# Patient Record
Sex: MALE | Race: OTHER | NOT HISPANIC OR LATINO | URBAN - METROPOLITAN AREA
[De-identification: names, ages, dates, MRNs, and addresses within clinical notes are randomized per-mention and may not be internally consistent; named-entity substitution may affect disease eponyms.]

---

## 2024-05-07 ENCOUNTER — HOSPITAL ENCOUNTER (INPATIENT)
Facility: HOSPITAL | Age: 44
LOS: 1 days | Discharge: HOME | DRG: 439 | End: 2024-05-09
Attending: EMERGENCY MEDICINE | Admitting: STUDENT IN AN ORGANIZED HEALTH CARE EDUCATION/TRAINING PROGRAM

## 2024-05-07 DIAGNOSIS — Z79.4 TYPE 2 DIABETES MELLITUS WITH OTHER SPECIFIED COMPLICATION, WITH LONG-TERM CURRENT USE OF INSULIN: ICD-10-CM

## 2024-05-07 DIAGNOSIS — R79.89 ELEVATED LFTS: ICD-10-CM

## 2024-05-07 DIAGNOSIS — R10.9 ABDOMINAL PAIN: ICD-10-CM

## 2024-05-07 DIAGNOSIS — F10.20 UNCOMPLICATED ALCOHOL DEPENDENCE: ICD-10-CM

## 2024-05-07 DIAGNOSIS — R07.9 CHEST PAIN: ICD-10-CM

## 2024-05-07 DIAGNOSIS — R10.13 EPIGASTRIC ABDOMINAL PAIN: Primary | ICD-10-CM

## 2024-05-07 DIAGNOSIS — K85.20 ALCOHOL-INDUCED ACUTE PANCREATITIS WITHOUT INFECTION OR NECROSIS: ICD-10-CM

## 2024-05-07 DIAGNOSIS — Z09 HOSPITAL DISCHARGE FOLLOW-UP: ICD-10-CM

## 2024-05-07 DIAGNOSIS — R11.2 NAUSEA AND VOMITING, UNSPECIFIED VOMITING TYPE: ICD-10-CM

## 2024-05-07 DIAGNOSIS — E11.69 TYPE 2 DIABETES MELLITUS WITH OTHER SPECIFIED COMPLICATION, WITH LONG-TERM CURRENT USE OF INSULIN: ICD-10-CM

## 2024-05-07 DIAGNOSIS — F10.90 ALCOHOL USE DISORDER: ICD-10-CM

## 2024-05-07 PROBLEM — I10 PRIMARY HYPERTENSION: Status: ACTIVE | Noted: 2024-05-07

## 2024-05-07 PROBLEM — E11.9 T2DM (TYPE 2 DIABETES MELLITUS): Status: ACTIVE | Noted: 2024-05-07

## 2024-05-07 PROBLEM — K76.0 HEPATIC STEATOSIS: Status: ACTIVE | Noted: 2024-05-07

## 2024-05-07 LAB
ALBUMIN SERPL BCP-MCNC: 3.9 G/DL (ref 3.5–5.2)
ALP SERPL-CCNC: 90 U/L (ref 55–135)
ALT SERPL W/O P-5'-P-CCNC: 610 U/L (ref 10–44)
ANION GAP SERPL CALC-SCNC: 14 MMOL/L (ref 8–16)
APAP SERPL-MCNC: <3 UG/ML (ref 10–20)
APTT PPP: 28.4 SEC (ref 21–32)
AST SERPL-CCNC: 547 U/L (ref 10–40)
BASOPHILS # BLD AUTO: 0.04 K/UL (ref 0–0.2)
BASOPHILS NFR BLD: 0.3 % (ref 0–1.9)
BILIRUB SERPL-MCNC: 1.3 MG/DL (ref 0.1–1)
BUN SERPL-MCNC: 9 MG/DL (ref 6–20)
BUN SERPL-MCNC: 9 MG/DL (ref 6–30)
CALCIUM SERPL-MCNC: 9.4 MG/DL (ref 8.7–10.5)
CHLORIDE SERPL-SCNC: 98 MMOL/L (ref 95–110)
CHLORIDE SERPL-SCNC: 99 MMOL/L (ref 95–110)
CO2 SERPL-SCNC: 21 MMOL/L (ref 23–29)
CREAT SERPL-MCNC: 0.7 MG/DL (ref 0.5–1.4)
CREAT SERPL-MCNC: 0.9 MG/DL (ref 0.5–1.4)
DIFFERENTIAL METHOD BLD: ABNORMAL
EOSINOPHIL # BLD AUTO: 0.1 K/UL (ref 0–0.5)
EOSINOPHIL NFR BLD: 0.6 % (ref 0–8)
ERYTHROCYTE [DISTWIDTH] IN BLOOD BY AUTOMATED COUNT: 14.2 % (ref 11.5–14.5)
EST. GFR  (NO RACE VARIABLE): >60 ML/MIN/1.73 M^2
ESTIMATED AVG GLUCOSE: 266 MG/DL (ref 68–131)
ETHANOL SERPL-MCNC: <10 MG/DL
GLUCOSE SERPL-MCNC: 262 MG/DL (ref 70–110)
GLUCOSE SERPL-MCNC: 267 MG/DL (ref 70–110)
HAV IGM SERPL QL IA: NORMAL
HBA1C MFR BLD: 10.9 % (ref 4–5.6)
HBV CORE IGM SERPL QL IA: NORMAL
HBV SURFACE AG SERPL QL IA: NORMAL
HCT VFR BLD AUTO: 45.2 % (ref 40–54)
HCT VFR BLD CALC: 53 %PCV (ref 36–54)
HCV AB SERPL QL IA: NORMAL
HCV AB SERPL QL IA: NORMAL
HGB BLD-MCNC: 15.1 G/DL (ref 14–18)
HIV 1+2 AB+HIV1 P24 AG SERPL QL IA: NORMAL
IMM GRANULOCYTES # BLD AUTO: 0.05 K/UL (ref 0–0.04)
IMM GRANULOCYTES NFR BLD AUTO: 0.4 % (ref 0–0.5)
INR PPP: 1.1 (ref 0.8–1.2)
INR PPP: 1.1 (ref 0.8–1.2)
LACTATE SERPL-SCNC: 1.1 MMOL/L (ref 0.5–2.2)
LIPASE SERPL-CCNC: 227 U/L (ref 4–60)
LYMPHOCYTES # BLD AUTO: 1.6 K/UL (ref 1–4.8)
LYMPHOCYTES NFR BLD: 13.4 % (ref 18–48)
MCH RBC QN AUTO: 25.5 PG (ref 27–31)
MCHC RBC AUTO-ENTMCNC: 33.4 G/DL (ref 32–36)
MCV RBC AUTO: 76 FL (ref 82–98)
MONOCYTES # BLD AUTO: 0.5 K/UL (ref 0.3–1)
MONOCYTES NFR BLD: 3.9 % (ref 4–15)
NEUTROPHILS # BLD AUTO: 10 K/UL (ref 1.8–7.7)
NEUTROPHILS NFR BLD: 81.4 % (ref 38–73)
NRBC BLD-RTO: 0 /100 WBC
OHS QRS DURATION: 76 MS
OHS QTC CALCULATION: 428 MS
PLATELET # BLD AUTO: 271 K/UL (ref 150–450)
PMV BLD AUTO: 9.7 FL (ref 9.2–12.9)
POC IONIZED CALCIUM: 1.12 MMOL/L (ref 1.06–1.42)
POC TCO2 (MEASURED): 25 MMOL/L (ref 23–29)
POCT GLUCOSE: 248 MG/DL (ref 70–110)
POTASSIUM BLD-SCNC: 3.8 MMOL/L (ref 3.5–5.1)
POTASSIUM SERPL-SCNC: 3.9 MMOL/L (ref 3.5–5.1)
PROT SERPL-MCNC: 7.2 G/DL (ref 6–8.4)
PROTHROMBIN TIME: 12.1 SEC (ref 9–12.5)
PROTHROMBIN TIME: 12.4 SEC (ref 9–12.5)
RBC # BLD AUTO: 5.92 M/UL (ref 4.6–6.2)
SAMPLE: ABNORMAL
SODIUM BLD-SCNC: 134 MMOL/L (ref 136–145)
SODIUM SERPL-SCNC: 134 MMOL/L (ref 136–145)
WBC # BLD AUTO: 12.24 K/UL (ref 3.9–12.7)

## 2024-05-07 PROCEDURE — G0378 HOSPITAL OBSERVATION PER HR: HCPCS

## 2024-05-07 PROCEDURE — 63600175 PHARM REV CODE 636 W HCPCS: Performed by: EMERGENCY MEDICINE

## 2024-05-07 PROCEDURE — 63600175 PHARM REV CODE 636 W HCPCS: Performed by: INTERNAL MEDICINE

## 2024-05-07 PROCEDURE — 80321 ALCOHOLS BIOMARKERS 1OR 2: CPT | Performed by: STUDENT IN AN ORGANIZED HEALTH CARE EDUCATION/TRAINING PROGRAM

## 2024-05-07 PROCEDURE — 85025 COMPLETE CBC W/AUTO DIFF WBC: CPT | Performed by: EMERGENCY MEDICINE

## 2024-05-07 PROCEDURE — 83690 ASSAY OF LIPASE: CPT | Performed by: EMERGENCY MEDICINE

## 2024-05-07 PROCEDURE — 80047 BASIC METABLC PNL IONIZED CA: CPT

## 2024-05-07 PROCEDURE — 82077 ASSAY SPEC XCP UR&BREATH IA: CPT | Performed by: STUDENT IN AN ORGANIZED HEALTH CARE EDUCATION/TRAINING PROGRAM

## 2024-05-07 PROCEDURE — 80053 COMPREHEN METABOLIC PANEL: CPT | Performed by: EMERGENCY MEDICINE

## 2024-05-07 PROCEDURE — 99285 EMERGENCY DEPT VISIT HI MDM: CPT | Mod: 25

## 2024-05-07 PROCEDURE — 96374 THER/PROPH/DIAG INJ IV PUSH: CPT

## 2024-05-07 PROCEDURE — 96361 HYDRATE IV INFUSION ADD-ON: CPT

## 2024-05-07 PROCEDURE — 85610 PROTHROMBIN TIME: CPT | Performed by: EMERGENCY MEDICINE

## 2024-05-07 PROCEDURE — 83036 HEMOGLOBIN GLYCOSYLATED A1C: CPT | Performed by: STUDENT IN AN ORGANIZED HEALTH CARE EDUCATION/TRAINING PROGRAM

## 2024-05-07 PROCEDURE — 85730 THROMBOPLASTIN TIME PARTIAL: CPT | Performed by: EMERGENCY MEDICINE

## 2024-05-07 PROCEDURE — 85610 PROTHROMBIN TIME: CPT | Mod: 91 | Performed by: STUDENT IN AN ORGANIZED HEALTH CARE EDUCATION/TRAINING PROGRAM

## 2024-05-07 PROCEDURE — 25000003 PHARM REV CODE 250: Performed by: EMERGENCY MEDICINE

## 2024-05-07 PROCEDURE — 80143 DRUG ASSAY ACETAMINOPHEN: CPT | Performed by: STUDENT IN AN ORGANIZED HEALTH CARE EDUCATION/TRAINING PROGRAM

## 2024-05-07 PROCEDURE — 83605 ASSAY OF LACTIC ACID: CPT | Performed by: EMERGENCY MEDICINE

## 2024-05-07 PROCEDURE — 82962 GLUCOSE BLOOD TEST: CPT

## 2024-05-07 PROCEDURE — 87389 HIV-1 AG W/HIV-1&-2 AB AG IA: CPT | Performed by: PHYSICIAN ASSISTANT

## 2024-05-07 PROCEDURE — 63600175 PHARM REV CODE 636 W HCPCS: Performed by: STUDENT IN AN ORGANIZED HEALTH CARE EDUCATION/TRAINING PROGRAM

## 2024-05-07 PROCEDURE — 93005 ELECTROCARDIOGRAM TRACING: CPT

## 2024-05-07 PROCEDURE — 93010 ELECTROCARDIOGRAM REPORT: CPT | Mod: ,,, | Performed by: INTERNAL MEDICINE

## 2024-05-07 PROCEDURE — 96375 TX/PRO/DX INJ NEW DRUG ADDON: CPT

## 2024-05-07 PROCEDURE — 25000003 PHARM REV CODE 250: Performed by: STUDENT IN AN ORGANIZED HEALTH CARE EDUCATION/TRAINING PROGRAM

## 2024-05-07 PROCEDURE — 80074 ACUTE HEPATITIS PANEL: CPT | Performed by: EMERGENCY MEDICINE

## 2024-05-07 PROCEDURE — 96376 TX/PRO/DX INJ SAME DRUG ADON: CPT

## 2024-05-07 PROCEDURE — 25500020 PHARM REV CODE 255: Performed by: EMERGENCY MEDICINE

## 2024-05-07 RX ORDER — ONDANSETRON HYDROCHLORIDE 2 MG/ML
8 INJECTION, SOLUTION INTRAVENOUS EVERY 8 HOURS PRN
Status: DISCONTINUED | OUTPATIENT
Start: 2024-05-07 | End: 2024-05-09 | Stop reason: HOSPADM

## 2024-05-07 RX ORDER — ALUMINUM HYDROXIDE, MAGNESIUM HYDROXIDE, AND SIMETHICONE 1200; 120; 1200 MG/30ML; MG/30ML; MG/30ML
30 SUSPENSION ORAL 4 TIMES DAILY PRN
Status: DISCONTINUED | OUTPATIENT
Start: 2024-05-07 | End: 2024-05-09 | Stop reason: HOSPADM

## 2024-05-07 RX ORDER — SODIUM CHLORIDE, SODIUM LACTATE, POTASSIUM CHLORIDE, CALCIUM CHLORIDE 600; 310; 30; 20 MG/100ML; MG/100ML; MG/100ML; MG/100ML
INJECTION, SOLUTION INTRAVENOUS CONTINUOUS
Status: DISCONTINUED | OUTPATIENT
Start: 2024-05-07 | End: 2024-05-08

## 2024-05-07 RX ORDER — MORPHINE SULFATE 4 MG/ML
4 INJECTION, SOLUTION INTRAMUSCULAR; INTRAVENOUS
Status: COMPLETED | OUTPATIENT
Start: 2024-05-07 | End: 2024-05-07

## 2024-05-07 RX ORDER — FAMOTIDINE 10 MG/ML
20 INJECTION INTRAVENOUS
Status: COMPLETED | OUTPATIENT
Start: 2024-05-07 | End: 2024-05-07

## 2024-05-07 RX ORDER — ALUMINUM HYDROXIDE, MAGNESIUM HYDROXIDE, AND SIMETHICONE 1200; 120; 1200 MG/30ML; MG/30ML; MG/30ML
15 SUSPENSION ORAL
Status: COMPLETED | OUTPATIENT
Start: 2024-05-07 | End: 2024-05-07

## 2024-05-07 RX ORDER — MORPHINE SULFATE 4 MG/ML
3 INJECTION, SOLUTION INTRAMUSCULAR; INTRAVENOUS EVERY 6 HOURS PRN
Status: DISCONTINUED | OUTPATIENT
Start: 2024-05-07 | End: 2024-05-09 | Stop reason: HOSPADM

## 2024-05-07 RX ORDER — IBUPROFEN 200 MG
24 TABLET ORAL
Status: DISCONTINUED | OUTPATIENT
Start: 2024-05-07 | End: 2024-05-09 | Stop reason: HOSPADM

## 2024-05-07 RX ORDER — OXYCODONE HYDROCHLORIDE 5 MG/1
5 TABLET ORAL EVERY 6 HOURS PRN
Status: DISCONTINUED | OUTPATIENT
Start: 2024-05-07 | End: 2024-05-09 | Stop reason: HOSPADM

## 2024-05-07 RX ORDER — GLUCAGON 1 MG
1 KIT INJECTION
Status: DISCONTINUED | OUTPATIENT
Start: 2024-05-07 | End: 2024-05-07

## 2024-05-07 RX ORDER — GLUCAGON 1 MG
1 KIT INJECTION
Status: DISCONTINUED | OUTPATIENT
Start: 2024-05-07 | End: 2024-05-09 | Stop reason: HOSPADM

## 2024-05-07 RX ORDER — SODIUM CHLORIDE, SODIUM LACTATE, POTASSIUM CHLORIDE, CALCIUM CHLORIDE 600; 310; 30; 20 MG/100ML; MG/100ML; MG/100ML; MG/100ML
INJECTION, SOLUTION INTRAVENOUS CONTINUOUS
Status: DISCONTINUED | OUTPATIENT
Start: 2024-05-07 | End: 2024-05-07

## 2024-05-07 RX ORDER — IBUPROFEN 200 MG
16 TABLET ORAL
Status: DISCONTINUED | OUTPATIENT
Start: 2024-05-07 | End: 2024-05-07

## 2024-05-07 RX ORDER — INSULIN ASPART 100 [IU]/ML
0-5 INJECTION, SOLUTION INTRAVENOUS; SUBCUTANEOUS
Status: DISCONTINUED | OUTPATIENT
Start: 2024-05-07 | End: 2024-05-07

## 2024-05-07 RX ORDER — NALOXONE HCL 0.4 MG/ML
0.02 VIAL (ML) INJECTION
Status: DISCONTINUED | OUTPATIENT
Start: 2024-05-07 | End: 2024-05-09 | Stop reason: HOSPADM

## 2024-05-07 RX ORDER — HYDRALAZINE HYDROCHLORIDE 20 MG/ML
10 INJECTION INTRAMUSCULAR; INTRAVENOUS EVERY 6 HOURS PRN
Status: DISCONTINUED | OUTPATIENT
Start: 2024-05-07 | End: 2024-05-08

## 2024-05-07 RX ORDER — IBUPROFEN 200 MG
24 TABLET ORAL
Status: DISCONTINUED | OUTPATIENT
Start: 2024-05-07 | End: 2024-05-07

## 2024-05-07 RX ORDER — AMLODIPINE BESYLATE 5 MG/1
5 TABLET ORAL DAILY
Status: DISCONTINUED | OUTPATIENT
Start: 2024-05-07 | End: 2024-05-09

## 2024-05-07 RX ORDER — SODIUM CHLORIDE 0.9 % (FLUSH) 0.9 %
10 SYRINGE (ML) INJECTION EVERY 12 HOURS PRN
Status: DISCONTINUED | OUTPATIENT
Start: 2024-05-07 | End: 2024-05-09 | Stop reason: HOSPADM

## 2024-05-07 RX ORDER — PROMETHAZINE HYDROCHLORIDE 25 MG/1
25 TABLET ORAL EVERY 6 HOURS PRN
Status: DISCONTINUED | OUTPATIENT
Start: 2024-05-07 | End: 2024-05-09 | Stop reason: HOSPADM

## 2024-05-07 RX ORDER — IBUPROFEN 200 MG
16 TABLET ORAL
Status: DISCONTINUED | OUTPATIENT
Start: 2024-05-07 | End: 2024-05-09 | Stop reason: HOSPADM

## 2024-05-07 RX ORDER — TALC
6 POWDER (GRAM) TOPICAL NIGHTLY PRN
Status: DISCONTINUED | OUTPATIENT
Start: 2024-05-07 | End: 2024-05-09 | Stop reason: HOSPADM

## 2024-05-07 RX ORDER — INSULIN ASPART 100 [IU]/ML
0-10 INJECTION, SOLUTION INTRAVENOUS; SUBCUTANEOUS
Status: DISCONTINUED | OUTPATIENT
Start: 2024-05-07 | End: 2024-05-09 | Stop reason: HOSPADM

## 2024-05-07 RX ORDER — POLYETHYLENE GLYCOL 3350 17 G/17G
17 POWDER, FOR SOLUTION ORAL DAILY
Status: DISCONTINUED | OUTPATIENT
Start: 2024-05-08 | End: 2024-05-09 | Stop reason: HOSPADM

## 2024-05-07 RX ORDER — ONDANSETRON HYDROCHLORIDE 2 MG/ML
4 INJECTION, SOLUTION INTRAVENOUS
Status: COMPLETED | OUTPATIENT
Start: 2024-05-07 | End: 2024-05-07

## 2024-05-07 RX ADMIN — AMLODIPINE BESYLATE 5 MG: 5 TABLET ORAL at 08:05

## 2024-05-07 RX ADMIN — OXYCODONE 5 MG: 5 TABLET ORAL at 07:05

## 2024-05-07 RX ADMIN — IOHEXOL 100 ML: 350 INJECTION, SOLUTION INTRAVENOUS at 03:05

## 2024-05-07 RX ADMIN — MORPHINE SULFATE 4 MG: 4 INJECTION INTRAVENOUS at 02:05

## 2024-05-07 RX ADMIN — HYDRALAZINE HYDROCHLORIDE 10 MG: 20 INJECTION, SOLUTION INTRAMUSCULAR; INTRAVENOUS at 09:05

## 2024-05-07 RX ADMIN — ALUMINUM HYDROXIDE, MAGNESIUM HYDROXIDE, AND SIMETHICONE 15 ML: 200; 200; 20 SUSPENSION ORAL at 02:05

## 2024-05-07 RX ADMIN — SODIUM CHLORIDE, POTASSIUM CHLORIDE, SODIUM LACTATE AND CALCIUM CHLORIDE: 600; 310; 30; 20 INJECTION, SOLUTION INTRAVENOUS at 06:05

## 2024-05-07 RX ADMIN — ONDANSETRON 4 MG: 2 INJECTION INTRAMUSCULAR; INTRAVENOUS at 02:05

## 2024-05-07 RX ADMIN — MORPHINE SULFATE 3 MG: 4 INJECTION INTRAVENOUS at 05:05

## 2024-05-07 RX ADMIN — SODIUM CHLORIDE 1000 ML: 9 INJECTION, SOLUTION INTRAVENOUS at 02:05

## 2024-05-07 RX ADMIN — FAMOTIDINE 20 MG: 10 INJECTION, SOLUTION INTRAVENOUS at 02:05

## 2024-05-07 NOTE — HPI
The patient is a 42 y/o M with NIDDM (unsure of his last A1c, off all therapies for some time), HTN and EtOH use disorder that presents to [facility] with 12 hours of epigastric abdominal pain. He notes that the pain came all of a sudden and was 10/10, localized to the epigastric region without radiation. Abdominal pain was associated with nausea and vomiting. He reports bilious emesis. 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 Drink 5-6 shots of fireball daily + 2 -3 beers. He lives with his brother in [facility]. His brother brought him to the hospital today. He has not taking any medicines for diabetes or HTN in some time, but he is not sure for how long. States he was on insulin when he was first diagnosed with diabetes in 2013 or so, but was off insulin and on oral meds within 1-2 years. He denies any fevers, chills, cough, congestion, HA, palpitations or urinary symptoms. He does have his gallbladder and appendix. Last drink was 1-2 nights ago. He is not yet interested in meeting with addiction psychiatry.

## 2024-05-07 NOTE — ASSESSMENT & PLAN NOTE
As evidenced by imaging findings on CT on 5/7, classic abdominal pain as well as Lipase 3x ULN.  - Supportive care  - Low fiber/low residue diet as patient reports an appetite   - Will start LR at 150 cc's/hr and reassess symptoms in the am   - No evidence of secondary complications   - Strict alcohol cessation   - Check PETH and Ethanol Level

## 2024-05-07 NOTE — ED TRIAGE NOTES
44 y/o M presents to ER with c/c abd pain rated 10/10. Pt diagnosed with hernia, pain been unbearable. Denies any dysuria, c/p, SOB, N/V/D, fevers and chills.

## 2024-05-07 NOTE — ASSESSMENT & PLAN NOTE
CT appears to show hepatic steatosis. Pattern is not typical that you would expect from EtOH alone. Hep panel negative.   - Trend daily CMP  - Obtain Liver US with doppler; assess biliary tree and GB for possible stones   - Pending trend and imaging findings, will consider adding in full workup for AI conditions and consider hepatology consult

## 2024-05-07 NOTE — ASSESSMENT & PLAN NOTE
Likely component of alcohol use and MASLD  - Will encourage exercise and weight loss  - Alcohol cessation counseling  - Hepatology clinic follow up

## 2024-05-07 NOTE — SUBJECTIVE & OBJECTIVE
History reviewed. No pertinent past medical history.    History reviewed. No pertinent surgical history.    Review of patient's allergies indicates:  No Known Allergies    No current facility-administered medications on file prior to encounter.     No current outpatient medications on file prior to encounter.     Family History    None       Tobacco Use    Smoking status: Not on file    Smokeless tobacco: Not on file   Substance and Sexual Activity    Alcohol use: Not on file    Drug use: Not on file    Sexual activity: Not on file     Review of Systems   Constitutional:  Positive for activity change. Negative for chills and fever.   HENT:  Positive for sore throat. Negative for congestion.    Respiratory:  Negative for cough and shortness of breath.    Cardiovascular:  Positive for chest pain (ache). Negative for palpitations.   Gastrointestinal:  Positive for abdominal pain (local are in the epigastric region), nausea and vomiting. Negative for blood in stool.   Genitourinary:  Negative for dysuria, frequency and urgency.   Musculoskeletal:  Negative for back pain and neck pain.   Skin:  Negative for rash and wound.   Neurological:  Positive for dizziness. Negative for headaches.     Objective:     Vital Signs (Most Recent):  Temp: 98.6 °F (37 °C) (05/07/24 1451)  Pulse: 86 (05/07/24 1451)  Resp: 14 (05/07/24 1451)  BP: (!) 120/56 (05/07/24 1451)  SpO2: 98 % (05/07/24 1451) Vital Signs (24h Range):  Temp:  [98 °F (36.7 °C)-98.7 °F (37.1 °C)] 98.6 °F (37 °C)  Pulse:  [] 86  Resp:  [14-18] 14  SpO2:  [97 %-99 %] 98 %  BP: (120-177)/() 120/56     Weight: 104.3 kg (230 lb)  There is no height or weight on file to calculate BMI.     Physical Exam  Vitals reviewed.   Constitutional:       General: He is not in acute distress.     Appearance: He is not toxic-appearing.   HENT:      Head: Normocephalic and atraumatic.      Nose: Nose normal.      Mouth/Throat:      Mouth: Mucous membranes are dry.   Eyes:       General: No scleral icterus.     Extraocular Movements: Extraocular movements intact.   Cardiovascular:      Rate and Rhythm: Normal rate.      Pulses: Normal pulses.   Pulmonary:      Effort: Pulmonary effort is normal. No respiratory distress.      Breath sounds: No wheezing or rales.   Abdominal:      General: Abdomen is flat. There is no distension.      Palpations: Abdomen is soft. There is no mass.      Tenderness: There is abdominal tenderness (epigastric region). There is no guarding.   Skin:     General: Skin is warm and dry.      Coloration: Skin is not jaundiced.   Neurological:      Mental Status: He is alert and oriented to person, place, and time. Mental status is at baseline.   Psychiatric:         Mood and Affect: Mood normal.         Behavior: Behavior normal.                Significant Labs: All pertinent labs within the past 24 hours have been reviewed.    Significant Imaging: I have reviewed all pertinent imaging results/findings within the past 24 hours.

## 2024-05-07 NOTE — ASSESSMENT & PLAN NOTE
Reports history of. No medications the patient is currently on.   - Monitor BP noting elevations may be 2/2 pain from pancreatitis before initiating BP medications   - Most recent BP within acceptable range to not start new medications

## 2024-05-07 NOTE — ASSESSMENT & PLAN NOTE
Patient's FSGs are uncontrolled due to hyperglycemia on current medication regimen.  Last A1c reviewed-   Lab Results   Component Value Date    HGBA1C 11.1 (H) 01/26/2023     Most recent fingerstick glucose reviewed-   Recent Labs   Lab 05/07/24  1316   POCTGLUCOSE 248*     Current correctional scale  Low  Maintain anti-hyperglycemic dose as follows-   Antihyperglycemics (From admission, onward)      Start     Stop Route Frequency Ordered    05/07/24 1810  insulin aspart U-100 pen 0-10 Units         -- SubQ Before meals & nightly PRN 05/07/24 1711          Hold Oral hypoglycemics while patient is in the hospital.    Will consider adding scheduled insulin pending needs.   Follow up HbA1c.

## 2024-05-07 NOTE — ED NOTES
"Patient identifiers for Harshad Dodson 43 y.o. male checked and correct.  Chief Complaint   Patient presents with    Abdominal Pain     "Stomach feels like it about to burst" hx of hernia mid abdomen. Pain 9/10 wants blood sugar checked cbg= 248. States daily drinker. Last drink yesterday. Diaphoretic in triage      No past medical history on file.  Allergies reported: Review of patient's allergies indicates:  No Known Allergies      LOC: Patient is awake, alert, and aware of environment with an appropriate affect. Patient is oriented x 4 and speaking appropriately.  APPEARANCE: Patient is lying supine and guarding abdomen. Patient is clean and well groomed, patient's clothing is properly fastened.  HEENT: - JVD, + midline trach  SKIN: The skin is warm and dry. Patient has normal skin turgor and moist mucus membranes.   MUSKULOSKELETAL: Patient is moving all extremities well, no obvious deformities noted. Pulses intact. PMS x 4  RESPIRATORY: Airway is open and patent. Respirations are spontaneous and non-labored with normal effort and rate. = CBBS  CARDIAC: No peripheral edema noted. + 2 bilateral pedal and radial pulses, < 3 s cap refill.  ABDOMEN: No distention noted. Soft and tender to touch, pain rated 10/10.  NEUROLOGICAL: pupils 3 mm, PERRL. Facial expression is symmetrical. Hand grasps are equal bilaterally. Normal sensation in all extremities when touched with finger.        "

## 2024-05-07 NOTE — Clinical Note
Diagnosis: Epigastric abdominal pain [407810]   Future Attending Provider: COOKIE CEBALLOS [[id number]]

## 2024-05-07 NOTE — H&P
"  [facility] - Emergency Dept  [facility]  History & Physical    Patient Name: Harshad Dodson  MRN: [id number]  Patient Class: OP- Observation  Admission Date: 5/7/2024  Attending Physician: Francis Dhaliwal DO   Primary Care Provider: No primary care provider on file.         Patient information was obtained from patient and ER records.     Subjective:     Principal Problem:Alcohol-induced acute pancreatitis without infection or necrosis    Chief Complaint:   Chief Complaint   Patient presents with    Abdominal Pain     "Stomach feels like it about to burst" hx of hernia mid abdomen. Pain 9/10 wants blood sugar checked cbg= 248. States daily drinker. Last drink yesterday. Diaphoretic in triage         HPI: The patient is a 42 y/o M with NIDDM (unsure of his last A1c, off all therapies for some time), HTN and EtOH use disorder that presents to [facility] with 12 hours of epigastric abdominal pain. He notes that the pain came all of a sudden and was 10/10, localized to the epigastric region without radiation. Abdominal pain was associated with nausea and vomiting. He reports bilious emesis. 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 Drink 5-6 shots of fireball daily + 2 -3 beers. He lives with his brother in [facility]. His brother brought him to the hospital today. He has not taking any medicines for diabetes or HTN in some time, but he is not sure for how long. States he was on insulin when he was first diagnosed with diabetes in 2013 or so, but was off insulin and on oral meds within 1-2 years. He denies any fevers, chills, cough, congestion, HA, palpitations or urinary symptoms. He does have his gallbladder and appendix. Last drink was 1-2 nights ago. He is not yet interested in meeting with addiction psychiatry. "     History reviewed. No pertinent past medical history.    History reviewed. No pertinent surgical history.    Review of patient's allergies indicates:  No Known Allergies    No current facility-administered medications on file prior to encounter.     No current outpatient medications on file prior to encounter.     Family History    None       Tobacco Use    Smoking status: Not on file    Smokeless tobacco: Not on file   Substance and Sexual Activity    Alcohol use: Not on file    Drug use: Not on file    Sexual activity: Not on file     Review of Systems   Constitutional:  Positive for activity change. Negative for chills and fever.   HENT:  Positive for sore throat. Negative for congestion.    Respiratory:  Negative for cough and shortness of breath.    Cardiovascular:  Positive for chest pain (ache). Negative for palpitations.   Gastrointestinal:  Positive for abdominal pain (local are in the epigastric region), nausea and vomiting. Negative for blood in stool.   Genitourinary:  Negative for dysuria, frequency and urgency.   Musculoskeletal:  Negative for back pain and neck pain.   Skin:  Negative for rash and wound.   Neurological:  Positive for dizziness. Negative for headaches.     Objective:     Vital Signs (Most Recent):  Temp: 98.6 °F (37 °C) (05/07/24 1451)  Pulse: 86 (05/07/24 1451)  Resp: 14 (05/07/24 1451)  BP: (!) 120/56 (05/07/24 1451)  SpO2: 98 % (05/07/24 1451) Vital Signs (24h Range):  Temp:  [98 °F (36.7 °C)-98.7 °F (37.1 °C)] 98.6 °F (37 °C)  Pulse:  [] 86  Resp:  [14-18] 14  SpO2:  [97 %-99 %] 98 %  BP: (120-177)/() 120/56     Weight: 104.3 kg (230 lb)  There is no height or weight on file to calculate BMI.     Physical Exam  Vitals reviewed.   Constitutional:       General: He is not in acute distress.     Appearance: He is not toxic-appearing.   HENT:      Head: Normocephalic and atraumatic.      Nose: Nose normal.      Mouth/Throat:      Mouth: Mucous membranes are dry.   Eyes:       General: No scleral icterus.     Extraocular Movements: Extraocular movements intact.   Cardiovascular:      Rate and Rhythm: Normal rate.      Pulses: Normal pulses.   Pulmonary:      Effort: Pulmonary effort is normal. No respiratory distress.      Breath sounds: No wheezing or rales.   Abdominal:      General: Abdomen is flat. There is no distension.      Palpations: Abdomen is soft. There is no mass.      Tenderness: There is abdominal tenderness (epigastric region). There is no guarding.   Skin:     General: Skin is warm and dry.      Coloration: Skin is not jaundiced.   Neurological:      Mental Status: He is alert and oriented to person, place, and time. Mental status is at baseline.   Psychiatric:         Mood and Affect: Mood normal.         Behavior: Behavior normal.                Significant Labs: All pertinent labs within the past 24 hours have been reviewed.    Significant Imaging: I have reviewed all pertinent imaging results/findings within the past 24 hours.  Assessment/Plan:     * Alcohol-induced acute pancreatitis without infection or necrosis  As evidenced by imaging findings on CT on 5/7, classic abdominal pain as well as Lipase 3x ULN.  - Supportive care  - Low fiber/low residue diet as patient reports an appetite   - Will start LR at 150 cc's/hr and reassess symptoms in the am   - No evidence of secondary complications   - Strict alcohol cessation   - Check PETH and Ethanol Level     Hepatic steatosis  Likely component of alcohol use and MASLD  - Will encourage exercise and weight loss  - Alcohol cessation counseling  - Hepatology clinic follow up       T2DM (type 2 diabetes mellitus)  Patient's FSGs are uncontrolled due to hyperglycemia on current medication regimen.  Last A1c reviewed-   Lab Results   Component Value Date    HGBA1C 11.1 (H) 01/26/2023     Most recent fingerstick glucose reviewed-   Recent Labs   Lab 05/07/24  1316   POCTGLUCOSE 248*     Current correctional scale   Low  Maintain anti-hyperglycemic dose as follows-   Antihyperglycemics (From admission, onward)      Start     Stop Route Frequency Ordered    05/07/24 1810  insulin aspart U-100 pen 0-10 Units         -- SubQ Before meals & nightly PRN 05/07/24 1711          Hold Oral hypoglycemics while patient is in the hospital.    Will consider adding scheduled insulin pending needs.   Follow up HbA1c.     Primary hypertension  Reports history of. No medications the patient is currently on.   - Monitor BP noting elevations may be 2/2 pain from pancreatitis before initiating BP medications   - Most recent BP within acceptable range to not start new medications     Elevated LFTs  CT appears to show hepatic steatosis. Pattern is not typical that you would expect from EtOH alone. Hep panel negative.   - Trend daily CMP  - Obtain Liver US with doppler; assess biliary tree and GB for possible stones   - Pending trend and imaging findings, will consider adding in full workup for AI conditions and consider hepatology consult     Alcohol use disorder  - Counseled cessation  - Not yet interested in speaking with addiction psych, will monitor readiness to discuss further      VTE Risk Mitigation (From admission, onward)           Ordered     IP VTE LOW RISK PATIENT  Once         05/07/24 1652     Place sequential compression device  Until discontinued         05/07/24 1652                       On 05/07/2024, patient should be placed in hospital observation services under my care.             Francis Dhaliwal DO  Department of Hospital Medicine  [facility] - Emergency Dept

## 2024-05-07 NOTE — ASSESSMENT & PLAN NOTE
- Counseled cessation  - Not yet interested in speaking with addiction psych, will monitor readiness to discuss further

## 2024-05-07 NOTE — ED PROVIDER NOTES
"Encounter Date: 5/7/2024       History     Chief Complaint   Patient presents with    Abdominal Pain     "Stomach feels like it about to burst" hx of hernia mid abdomen. Pain 9/10 wants blood sugar checked cbg= 248. States daily drinker. Last drink yesterday. Diaphoretic in triage      Patient is a 43-year-old male with past medical history of DM2, alcohol dependence who presents with epigastric pain that started yesterday and worsened today.  He notes diarrhea yesterday and nausea with vomiting today.  He denies any bloody or coffee ground emesis.  He notes he wanted to take ibuprofen for his symptoms today but his blood sugar was elevated and he did not want to take it on an empty stomach.  He is a non-insulin dependent diabetic and was on pills however he stopped going to the doctor  and taking his medication last year.  He notes he drank 6 small fire balls and 2, 16 oz beer daily.  He would like to quit drinking as he reports he is using it has a clutch.  He has never been to rehab before, and he denies a history of complicated withdrawal including seizures or DTs.    The history is provided by the patient and medical records.     Review of patient's allergies indicates:  No Known Allergies  History reviewed. No pertinent past medical history.  History reviewed. No pertinent surgical history.  No family history on file.         Physical Exam     Initial Vitals [05/07/24 1316]   BP Pulse Resp Temp SpO2   135/89 106 18 98 °F (36.7 °C) 99 %      MAP       --         Physical Exam    Nursing note and vitals reviewed.  Constitutional: He appears well-developed and well-nourished. He is not diaphoretic. No distress.   HENT:   Head: Normocephalic and atraumatic.   Eyes: EOM are normal.   Neck: Neck supple.   Normal range of motion.  Cardiovascular:  Normal rate and regular rhythm.           Pulmonary/Chest: No respiratory distress.   Abdominal: Abdomen is soft. He exhibits no distension. There is abdominal tenderness " (Epigastric). There is no rebound and no guarding.   Musculoskeletal:         General: Normal range of motion.      Cervical back: Normal range of motion and neck supple.     Neurological: He is alert and oriented to person, place, and time. GCS score is 15. GCS eye subscore is 4. GCS verbal subscore is 5. GCS motor subscore is 6.   No tremors   Skin: Skin is warm and dry.   Psychiatric: He has a normal mood and affect. His behavior is normal. Judgment and thought content normal.         ED Course   Procedures  Labs Reviewed   CBC W/ AUTO DIFFERENTIAL - Abnormal; Notable for the following components:       Result Value    MCV 76 (*)     MCH 25.5 (*)     Gran # (ANC) 10.0 (*)     Immature Grans (Abs) 0.05 (*)     Gran % 81.4 (*)     Lymph % 13.4 (*)     Mono % 3.9 (*)     All other components within normal limits    Narrative:     Release to patient->Immediate   COMPREHENSIVE METABOLIC PANEL - Abnormal; Notable for the following components:    Sodium 134 (*)     CO2 21 (*)     Glucose 262 (*)     Total Bilirubin 1.3 (*)      (*)      (*)     All other components within normal limits    Narrative:     Release to patient->Immediate   LIPASE - Abnormal; Notable for the following components:    Lipase 227 (*)     All other components within normal limits    Narrative:     Release to patient->Immediate   HEMOGLOBIN A1C - Abnormal; Notable for the following components:    Hemoglobin A1C 10.9 (*)     Estimated Avg Glucose 266 (*)     All other components within normal limits    Narrative:     Unit collect    ACETAMINOPHEN LEVEL - Abnormal; Notable for the following components:    Acetaminophen (Tylenol), Serum <3.0 (*)     All other components within normal limits   POCT GLUCOSE - Abnormal; Notable for the following components:    POCT Glucose 248 (*)     All other components within normal limits   ISTAT PROCEDURE - Abnormal; Notable for the following components:    POC Glucose 267 (*)     POC Sodium 134 (*)      All other components within normal limits   HIV 1 / 2 ANTIBODY    Narrative:     Release to patient->Immediate  Add on HEPAC per MD [facility] order [id number]   HEPATITIS C ANTIBODY    Narrative:     Release to patient->Immediate  Add on HEPAC per MD MCNA[facility] order [id number]  absorbed by other test HEPAC   LACTIC ACID, PLASMA   HEPATITIS PANEL, ACUTE   APTT   PROTIME-INR   HEPATITIS PANEL, ACUTE    Narrative:     Release to patient->Immediate  Add on HEPAC per MD [facility]NA[facility] order [id number]   ALCOHOL,MEDICAL (ETHANOL)    Narrative:     Unit collect    PROTIME-INR   PHOSPHATIDYLETHANOL (PETH)    Narrative:     Unit collect    POCT GLUCOSE, HAND-HELD DEVICE   POCT GLUCOSE, HAND-HELD DEVICE   ISTAT CHEM8   POCT GLUCOSE MONITORING CONTINUOUS     EKG Readings: (Independently Interpreted)   Initial Reading: No STEMI.   EKG done at 1:22 p.m. normal sinus rhythm rate of 89 normal axis normal intervals     ECG Results              EKG 12-lead (Final result)        Collection Time Result Time QRS Duration OHS QTC Calculation    05/07/24 13:22:33 05/07/24 14:38:11 76 428                     Final result by Interface, Lab In [facility] (05/07/24 14:38:15)                   Narrative:    Test Reason : R10.9,    Vent. Rate : 089 BPM     Atrial Rate : 089 BPM     P-R Int : 132 ms          QRS Dur : 076 ms      QT Int : 352 ms       P-R-T Axes : 032 -14 049 degrees     QTc Int : 428 ms    Normal sinus rhythm  Minimal voltage criteria for LVH, may be normal variant ( R in aVL )  Borderline Abnormal ECG  No previous ECGs available  Confirmed by Fred ROBLEDO MD ([id number]) on 5/7/2024 2:38:10 PM    Referred By:             Confirmed By:Fred ROBLEDO MD                                  Imaging Results              US Liver with Doppler (xpd) (Final result)  Result time 05/08/24 10:27:36      Final result by Ayden Crowell MD (05/08/24 10:27:36)                   Impression:      Satisfactory Doppler evaluation of the  liver.    5.9 cm complex fluid collection adjacent to the inferior right hepatic lobe.    Mild gallbladder wall thickening with pericolic fluid collection without gallstones or hypervascularity.  No convincing evidence of acute cholecystitis.  Further evaluation as clinically indicated.    Electronically signed by resident: Jayshree Drake  Date:    05/08/2024  Time:    10:10    Electronically signed by: Ayden Crowell MD  Date:    05/08/2024  Time:    10:27               Narrative:    EXAMINATION:  US LIVER WITH DOPPLER    CLINICAL HISTORY:  elevated LFT's, assess biliary system;    TECHNIQUE:  Complete abdominal ultrasound with doppler.  Color and spectral Doppler were performed.    COMPARISON:  CT abdomen pelvis 05/07/2024    FINDINGS:  The visualized portion of the pancreas is unremarkable.    The liver is normal in size, measures 16 cm.  The liver demonstrates homogeneous echotexture.  No focal hepatic lesions are seen.    No cholelithiasis.  Mild gallbladder wall thickening measuring 5 mm with pericolic fluid collection.  No hypervascularity of the gallbladder wall.  The common duct is not dilated, measuring 4  mm.No dilated intrahepatic radicles are seen.    The spleen is normal in size measuring 8.9 x 3.4 cm with a homogeneous echotexture.    There is small amount of ascites. Complex collection in the right upper quadrant adjacent to the inferior aspect of the right hepatic lobe measure 5.9 x 2.8 x 4.7 cm.    The main portal vein, right portal vein, left portal vein, middle hepatic vein, right hepatic vein, left hepatic vein, SMV, and IVC are patent with proper directional flow.  The main hepatic artery is patent. The umbilical vein is not patent.                                       CT Abdomen Pelvis With IV Contrast NO Oral Contrast (Final result)  Result time 05/07/24 15:47:26      Final result by Bill Ospina MD (05/07/24 15:47:26)                   Impression:      1. Findings suggest acute  pancreatitis, no focal organized collection at this time.  Correlation and follow-up advised.  2. Findings suggest hepatic steatosis, correlation with LFTs recommended.  3. Please see above for several additional findings.      Electronically signed by: Bill Ospina MD  Date:    05/07/2024  Time:    15:47               Narrative:    EXAMINATION:  CT ABDOMEN PELVIS WITH IV CONTRAST    CLINICAL HISTORY:  Epigastric pain;    TECHNIQUE:  Low dose axial images, sagittal and coronal reformations were obtained from the lung bases to the pubic symphysis following the IV administration of 100 mL of Omnipaque 350 .  Oral contrast was not given.    COMPARISON:  None.    FINDINGS:  Images of the lower thorax are remarkable for bilateral dependent atelectasis.    The liver is hypoattenuating suggesting steatosis, correlation with LFTs recommended.  The spleen, adrenal glands and gallbladder are unremarkable.  The stomach is relatively decompressed without wall thickening.  There is diffuse peripancreatic inflammation/fluid, no significant pancreatic ductal dilation.  No focal organized collection.  The portal vein, splenic vein, SMV, celiac axis and SMA all are patent.  There are a few scattered abdominal lymph nodes.    The kidneys enhance symmetrically without hydronephrosis or nephrolithiasis.  The bilateral ureters are unremarkable without calculi seen.  The urinary bladder is mildly distended without wall thickening.  The prostate is not enlarged.    The large bowel is grossly unremarkable.  The terminal ileum and appendix are unremarkable.  The small bowel is grossly unremarkable.  There are a few scattered shotty periaortic, pericaval, and mesenteric lymph nodes.  No focal organized pelvic fluid collection.    There are degenerative changes of the spine.  No significant inguinal lymphadenopathy.                                       Medications   sodium chloride 0.9% flush 10 mL (has no administration in time range)    naloxone 0.4 mg/mL injection 0.02 mg (has no administration in time range)   polyethylene glycol packet 17 g (17 g Oral Given 5/8/24 1014)   ondansetron injection 8 mg (has no administration in time range)   promethazine tablet 25 mg (has no administration in time range)   melatonin tablet 6 mg (has no administration in time range)   aluminum-magnesium hydroxide-simethicone 200-200-20 mg/5 mL suspension 30 mL (has no administration in time range)   morphine injection 3 mg (3 mg Intravenous Given 5/8/24 0003)   oxyCODONE immediate release tablet 5 mg (5 mg Oral Given 5/8/24 2004)   glucose chewable tablet 16 g (has no administration in time range)   glucose chewable tablet 24 g (has no administration in time range)   glucagon (human recombinant) injection 1 mg (has no administration in time range)   insulin aspart U-100 pen 0-10 Units (0 Units Subcutaneous Hold 5/8/24 2008)   dextrose 10% bolus 125 mL 125 mL (has no administration in time range)   dextrose 10% bolus 250 mL 250 mL (has no administration in time range)   amLODIPine tablet 5 mg (5 mg Oral Given 5/8/24 1014)   multivitamin tablet (1 tablet Oral Given 5/8/24 1507)   thiamine tablet 100 mg (100 mg Oral Given 5/8/24 1507)   folic acid tablet 1 mg (1 mg Oral Given 5/8/24 1507)   insulin glargine U-100 (Lantus) pen 9 Units (9 Units Subcutaneous Given 5/8/24 2006)   insulin aspart U-100 pen 6 Units (6 Units Subcutaneous Given 5/8/24 1823)   sodium chloride 0.9% bolus 1,000 mL 1,000 mL (0 mLs Intravenous Stopped 5/7/24 1600)   morphine injection 4 mg (4 mg Intravenous Given 5/7/24 1440)   ondansetron injection 4 mg (4 mg Intravenous Given 5/7/24 1440)   aluminum-magnesium hydroxide-simethicone 200-200-20 mg/5 mL suspension 15 mL (15 mLs Oral Given 5/7/24 1440)   famotidine (PF) injection 20 mg (20 mg Intravenous Given 5/7/24 1440)   iohexoL (OMNIPAQUE 350) injection 100 mL (100 mLs Intravenous Given 5/7/24 1536)   insulin aspart U-100 pen 6 Units (6 Units  Subcutaneous Given 5/8/24 1350)     Medical Decision Making  DDX:  Pancreatitis, cholelithiasis, cholecystitis, gastritis, GERD, SBO, colitis    44 yo M with PMH of alcohol abuse and dependence who presents with severe epigastric pain associated with nausea and vomiting. He has an lipase, T bili and AST, ALT and signs of pancreatitis on CT. I think he will require admission for symptoms control. he may go into withdrawal as well and he would like detox, but he is not currently showing signs    Amount and/or Complexity of Data Reviewed  Labs: ordered.  Radiology: ordered.  ECG/medicine tests: ordered and independent interpretation performed. Decision-making details documented in ED Course.    Risk  OTC drugs.  Prescription drug management.  Parenteral controlled substances.  Decision regarding hospitalization.               ED Course as of 05/08/24 2316   Tue May 07, 2024   1437 Elevated liver enzymes and lipase, CT pending to evaluate for signs of biliary obstruction  Patient will likely require admission [GK]      ED Course User Index  [GK] Lakshmi Cavazos MD                           Clinical Impression:  Final diagnoses:  [R10.9] Abdominal pain  [R10.13] Epigastric abdominal pain (Primary)  [R11.2] Nausea and vomiting, unspecified vomiting type  [F10.20] Uncomplicated alcohol dependence  [K85.20] Alcohol-induced acute pancreatitis without infection or necrosis          ED Disposition Condition    Observation Stable                Lakshmi Cavazos MD  05/08/24 5793

## 2024-05-08 PROBLEM — E87.1 HYPONATREMIA: Status: ACTIVE | Noted: 2024-05-08

## 2024-05-08 LAB
ALBUMIN SERPL BCP-MCNC: 3.2 G/DL (ref 3.5–5.2)
ALP SERPL-CCNC: 96 U/L (ref 55–135)
ALT SERPL W/O P-5'-P-CCNC: 351 U/L (ref 10–44)
ANION GAP SERPL CALC-SCNC: 16 MMOL/L (ref 8–16)
AST SERPL-CCNC: 120 U/L (ref 10–40)
BASOPHILS # BLD AUTO: 0.05 K/UL (ref 0–0.2)
BASOPHILS NFR BLD: 0.5 % (ref 0–1.9)
BILIRUB SERPL-MCNC: 1.3 MG/DL (ref 0.1–1)
BUN SERPL-MCNC: 11 MG/DL (ref 6–20)
CALCIUM SERPL-MCNC: 8.9 MG/DL (ref 8.7–10.5)
CHLORIDE SERPL-SCNC: 100 MMOL/L (ref 95–110)
CO2 SERPL-SCNC: 15 MMOL/L (ref 23–29)
CREAT SERPL-MCNC: 0.9 MG/DL (ref 0.5–1.4)
DIFFERENTIAL METHOD BLD: ABNORMAL
EOSINOPHIL # BLD AUTO: 0 K/UL (ref 0–0.5)
EOSINOPHIL NFR BLD: 0.2 % (ref 0–8)
ERYTHROCYTE [DISTWIDTH] IN BLOOD BY AUTOMATED COUNT: 15.7 % (ref 11.5–14.5)
EST. GFR  (NO RACE VARIABLE): >60 ML/MIN/1.73 M^2
GLUCOSE SERPL-MCNC: 271 MG/DL (ref 70–110)
HCT VFR BLD AUTO: 50.2 % (ref 40–54)
HGB BLD-MCNC: 16.4 G/DL (ref 14–18)
IMM GRANULOCYTES # BLD AUTO: 0.04 K/UL (ref 0–0.04)
IMM GRANULOCYTES NFR BLD AUTO: 0.4 % (ref 0–0.5)
LYMPHOCYTES # BLD AUTO: 1.4 K/UL (ref 1–4.8)
LYMPHOCYTES NFR BLD: 13.9 % (ref 18–48)
MAGNESIUM SERPL-MCNC: 2.1 MG/DL (ref 1.6–2.6)
MCH RBC QN AUTO: 25.9 PG (ref 27–31)
MCHC RBC AUTO-ENTMCNC: 32.7 G/DL (ref 32–36)
MCV RBC AUTO: 79 FL (ref 82–98)
MONOCYTES # BLD AUTO: 0.7 K/UL (ref 0.3–1)
MONOCYTES NFR BLD: 6.7 % (ref 4–15)
NEUTROPHILS # BLD AUTO: 7.6 K/UL (ref 1.8–7.7)
NEUTROPHILS NFR BLD: 78.3 % (ref 38–73)
NRBC BLD-RTO: 0 /100 WBC
PLATELET # BLD AUTO: 202 K/UL (ref 150–450)
PMV BLD AUTO: 9.6 FL (ref 9.2–12.9)
POCT GLUCOSE: 189 MG/DL (ref 70–110)
POCT GLUCOSE: 256 MG/DL (ref 70–110)
POCT GLUCOSE: 258 MG/DL (ref 70–110)
POCT GLUCOSE: 263 MG/DL (ref 70–110)
POCT GLUCOSE: 292 MG/DL (ref 70–110)
POCT GLUCOSE: 362 MG/DL (ref 70–110)
POCT GLUCOSE: 368 MG/DL (ref 70–110)
POTASSIUM SERPL-SCNC: 4.2 MMOL/L (ref 3.5–5.1)
PROT SERPL-MCNC: 6.4 G/DL (ref 6–8.4)
RBC # BLD AUTO: 6.33 M/UL (ref 4.6–6.2)
SODIUM SERPL-SCNC: 131 MMOL/L (ref 136–145)
WBC # BLD AUTO: 9.75 K/UL (ref 3.9–12.7)

## 2024-05-08 PROCEDURE — 96376 TX/PRO/DX INJ SAME DRUG ADON: CPT

## 2024-05-08 PROCEDURE — 96372 THER/PROPH/DIAG INJ SC/IM: CPT | Performed by: STUDENT IN AN ORGANIZED HEALTH CARE EDUCATION/TRAINING PROGRAM

## 2024-05-08 PROCEDURE — 80053 COMPREHEN METABOLIC PANEL: CPT | Performed by: STUDENT IN AN ORGANIZED HEALTH CARE EDUCATION/TRAINING PROGRAM

## 2024-05-08 PROCEDURE — 11000001 HC ACUTE MED/SURG PRIVATE ROOM

## 2024-05-08 PROCEDURE — 85025 COMPLETE CBC W/AUTO DIFF WBC: CPT | Performed by: STUDENT IN AN ORGANIZED HEALTH CARE EDUCATION/TRAINING PROGRAM

## 2024-05-08 PROCEDURE — 83735 ASSAY OF MAGNESIUM: CPT | Performed by: STUDENT IN AN ORGANIZED HEALTH CARE EDUCATION/TRAINING PROGRAM

## 2024-05-08 PROCEDURE — 63600175 PHARM REV CODE 636 W HCPCS: Performed by: STUDENT IN AN ORGANIZED HEALTH CARE EDUCATION/TRAINING PROGRAM

## 2024-05-08 PROCEDURE — 96361 HYDRATE IV INFUSION ADD-ON: CPT

## 2024-05-08 PROCEDURE — 25000003 PHARM REV CODE 250: Performed by: STUDENT IN AN ORGANIZED HEALTH CARE EDUCATION/TRAINING PROGRAM

## 2024-05-08 RX ORDER — THIAMINE HCL 100 MG
100 TABLET ORAL DAILY
Status: DISCONTINUED | OUTPATIENT
Start: 2024-05-08 | End: 2024-05-09 | Stop reason: HOSPADM

## 2024-05-08 RX ORDER — INSULIN GLARGINE 100 [IU]/ML
9 INJECTION, SOLUTION SUBCUTANEOUS 2 TIMES DAILY
Status: DISCONTINUED | OUTPATIENT
Start: 2024-05-08 | End: 2024-05-09

## 2024-05-08 RX ORDER — INSULIN GLARGINE 100 [IU]/ML
7 INJECTION, SOLUTION SUBCUTANEOUS DAILY
Status: DISCONTINUED | OUTPATIENT
Start: 2024-05-08 | End: 2024-05-08

## 2024-05-08 RX ORDER — INSULIN GLARGINE 100 [IU]/ML
5 INJECTION, SOLUTION SUBCUTANEOUS DAILY
Status: DISCONTINUED | OUTPATIENT
Start: 2024-05-08 | End: 2024-05-08

## 2024-05-08 RX ORDER — INSULIN ASPART 100 [IU]/ML
6 INJECTION, SOLUTION INTRAVENOUS; SUBCUTANEOUS
Status: DISCONTINUED | OUTPATIENT
Start: 2024-05-08 | End: 2024-05-09

## 2024-05-08 RX ORDER — INSULIN GLARGINE 100 [IU]/ML
7 INJECTION, SOLUTION SUBCUTANEOUS 2 TIMES DAILY
Status: DISCONTINUED | OUTPATIENT
Start: 2024-05-08 | End: 2024-05-08

## 2024-05-08 RX ORDER — FOLIC ACID 1 MG/1
1 TABLET ORAL DAILY
Status: DISCONTINUED | OUTPATIENT
Start: 2024-05-08 | End: 2024-05-09 | Stop reason: HOSPADM

## 2024-05-08 RX ORDER — INSULIN ASPART 100 [IU]/ML
6 INJECTION, SOLUTION INTRAVENOUS; SUBCUTANEOUS ONCE
Status: COMPLETED | OUTPATIENT
Start: 2024-05-08 | End: 2024-05-08

## 2024-05-08 RX ADMIN — INSULIN GLARGINE 7 UNITS: 100 INJECTION, SOLUTION SUBCUTANEOUS at 10:05

## 2024-05-08 RX ADMIN — AMLODIPINE BESYLATE 5 MG: 5 TABLET ORAL at 10:05

## 2024-05-08 RX ADMIN — FOLIC ACID 1 MG: 1 TABLET ORAL at 03:05

## 2024-05-08 RX ADMIN — INSULIN ASPART 5 UNITS: 100 INJECTION, SOLUTION INTRAVENOUS; SUBCUTANEOUS at 12:05

## 2024-05-08 RX ADMIN — THERA TABS 1 TABLET: TAB at 03:05

## 2024-05-08 RX ADMIN — INSULIN ASPART 6 UNITS: 100 INJECTION, SOLUTION INTRAVENOUS; SUBCUTANEOUS at 01:05

## 2024-05-08 RX ADMIN — INSULIN ASPART 2 UNITS: 100 INJECTION, SOLUTION INTRAVENOUS; SUBCUTANEOUS at 06:05

## 2024-05-08 RX ADMIN — OXYCODONE 5 MG: 5 TABLET ORAL at 02:05

## 2024-05-08 RX ADMIN — OXYCODONE 5 MG: 5 TABLET ORAL at 08:05

## 2024-05-08 RX ADMIN — INSULIN GLARGINE 9 UNITS: 100 INJECTION, SOLUTION SUBCUTANEOUS at 08:05

## 2024-05-08 RX ADMIN — INSULIN ASPART 6 UNITS: 100 INJECTION, SOLUTION INTRAVENOUS; SUBCUTANEOUS at 06:05

## 2024-05-08 RX ADMIN — INSULIN ASPART 6 UNITS: 100 INJECTION, SOLUTION INTRAVENOUS; SUBCUTANEOUS at 10:05

## 2024-05-08 RX ADMIN — MORPHINE SULFATE 3 MG: 4 INJECTION INTRAVENOUS at 12:05

## 2024-05-08 RX ADMIN — POLYETHYLENE GLYCOL 3350 17 G: 17 POWDER, FOR SOLUTION ORAL at 10:05

## 2024-05-08 RX ADMIN — INSULIN ASPART 10 UNITS: 100 INJECTION, SOLUTION INTRAVENOUS; SUBCUTANEOUS at 01:05

## 2024-05-08 RX ADMIN — Medication 100 MG: at 03:05

## 2024-05-08 NOTE — SUBJECTIVE & OBJECTIVE
Interval History:    Doing well this am. Agreeable to discussing EtOH use disorder with addiction psych for assistance. Tolerating po intake without nausea or vomiting. IR consulted for 5.9 cm complex fluid collection adjacent to the inferior right hepatic lobe noted on US.     Review of Systems   Constitutional:  Positive for activity change. Negative for chills and fever.   HENT:  Positive for sore throat. Negative for congestion.    Respiratory:  Negative for cough and shortness of breath.    Cardiovascular:  Positive for chest pain (ache). Negative for palpitations.   Gastrointestinal:  Positive for abdominal pain (local are in the epigastric region), nausea and vomiting. Negative for blood in stool.   Genitourinary:  Negative for dysuria, frequency and urgency.   Musculoskeletal:  Negative for back pain and neck pain.   Skin:  Negative for rash and wound.   Neurological:  Positive for dizziness. Negative for headaches.     Objective:     Vital Signs (Most Recent):  Temp: 97.9 °F (36.6 °C) (05/08/24 1110)  Pulse: 110 (05/08/24 1110)  Resp: 18 (05/08/24 1110)  BP: 124/81 (05/08/24 1110)  SpO2: 98 % (05/08/24 1110) Vital Signs (24h Range):  Temp:  [97.5 °F (36.4 °C)-98.7 °F (37.1 °C)] 97.9 °F (36.6 °C)  Pulse:  [] 110  Resp:  [14-20] 18  SpO2:  [97 %-100 %] 98 %  BP: (110-211)/() 124/81     Weight: 103.7 kg (228 lb 10.6 oz)  Body mass index is 34.77 kg/m².     Physical Exam  Vitals reviewed.   Constitutional:       General: He is not in acute distress.     Appearance: He is not toxic-appearing.   HENT:      Head: Normocephalic and atraumatic.      Nose: Nose normal.      Mouth/Throat:      Mouth: Mucous membranes are dry.   Eyes:      General: No scleral icterus.     Extraocular Movements: Extraocular movements intact.   Cardiovascular:      Rate and Rhythm: Normal rate.      Pulses: Normal pulses.   Pulmonary:      Effort: Pulmonary effort is normal. No respiratory distress.      Breath sounds: No  wheezing or rales.   Abdominal:      General: Abdomen is flat. There is no distension.      Palpations: Abdomen is soft. There is no mass.      Tenderness: There is abdominal tenderness (epigastric region). There is no guarding.   Skin:     General: Skin is warm and dry.      Coloration: Skin is not jaundiced.   Neurological:      Mental Status: He is alert and oriented to person, place, and time. Mental status is at baseline.   Psychiatric:         Mood and Affect: Mood normal.         Behavior: Behavior normal.                Significant Labs: All pertinent labs within the past 24 hours have been reviewed.    Significant Imaging: I have reviewed all pertinent imaging results/findings within the past 24 hours.

## 2024-05-08 NOTE — PROGRESS NOTES
[facility]  Progress Note    Patient Name: Harshad Dodson  MRN: [id number]  Patient Class: OP- Observation   Admission Date: 5/7/2024  Length of Stay: 0 days  Attending Physician: Francis Dhaliwal DO  Primary Care Provider: Marisel, Primary Doctor        Subjective:     Principal Problem:Alcohol-induced acute pancreatitis without infection or necrosis        HPI:  The patient is a 44 y/o M with NIDDM (unsure of his last A1c, off all therapies for some time), HTN and EtOH use disorder that presents to [facility] with 12 hours of epigastric abdominal pain. He notes that the pain came all of a sudden and was 10/10, localized to the epigastric region without radiation. Abdominal pain was associated with nausea and vomiting. He reports bilious emesis. 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 Drink 5-6 shots of fireball daily + 2 -3 beers. He lives with his brother in [place]. His brother brought him to the hospital today. He has not taking any medicines for diabetes or HTN in some time, but he is not sure for how long. States he was on insulin when he was first diagnosed with diabetes in 2013 or so, but was off insulin and on oral meds within 1-2 years. He denies any fevers, chills, cough, congestion, HA, palpitations or urinary symptoms. He does have his gallbladder and appendix. Last drink was 1-2 nights ago. He is not yet interested in meeting with addiction psychiatry.     Overview/Hospital Course:  Patient admitted to hospital medicine on 5/7 for acute pancreatitis 2/2 alcohol use as evidenced by imaging findings on CT on 5/7, classic abdominal pain as well as Lipase 3x ULN. Started on IVF's and low fiber, low residue diet. Tolerated po intake well on 5/8 so fluids discontinued. Abdominal pain  persists, but mild in quality. US Liver obtained to rule out gallstone disease/burden as well given noted elevated LFT's in the 500's. US Liver noting the liver is normal in size, measures 16 cm. The liver demonstrates homogeneous echo texture with no focal hepatic lesions seen. No biliary ductal dilatation. However, 5.9 cm complex fluid collection adjacent to the inferior right hepatic lobe was noted. IR consulted to weigh in on need for possible drainage vs outpatient repeat interval imaging.      Pending PETH. IR consulted to weigh in on cystic structure noted on US Liver.     Interval History:    Doing well this am. Agreeable to discussing EtOH use disorder with addiction psych for assistance. Tolerating po intake without nausea or vomiting. IR consulted for 5.9 cm complex fluid collection adjacent to the inferior right hepatic lobe noted on US.     Review of Systems   Constitutional:  Positive for activity change. Negative for chills and fever.   HENT:  Positive for sore throat. Negative for congestion.    Respiratory:  Negative for cough and shortness of breath.    Cardiovascular:  Positive for chest pain (ache). Negative for palpitations.   Gastrointestinal:  Positive for abdominal pain (local are in the epigastric region), nausea and vomiting. Negative for blood in stool.   Genitourinary:  Negative for dysuria, frequency and urgency.   Musculoskeletal:  Negative for back pain and neck pain.   Skin:  Negative for rash and wound.   Neurological:  Positive for dizziness. Negative for headaches.     Objective:     Vital Signs (Most Recent):  Temp: 97.9 °F (36.6 °C) (05/08/24 1110)  Pulse: 110 (05/08/24 1110)  Resp: 18 (05/08/24 1110)  BP: 124/81 (05/08/24 1110)  SpO2: 98 % (05/08/24 1110) Vital Signs (24h Range):  Temp:  [97.5 °F (36.4 °C)-98.7 °F (37.1 °C)] 97.9 °F (36.6 °C)  Pulse:  [] 110  Resp:  [14-20] 18  SpO2:  [97 %-100 %] 98 %  BP: (110-211)/() 124/81     Weight: 103.7 kg (228 lb 10.6  oz)  Body mass index is 34.77 kg/m².     Physical Exam  Vitals reviewed.   Constitutional:       General: He is not in acute distress.     Appearance: He is not toxic-appearing.   HENT:      Head: Normocephalic and atraumatic.      Nose: Nose normal.      Mouth/Throat:      Mouth: Mucous membranes are dry.   Eyes:      General: No scleral icterus.     Extraocular Movements: Extraocular movements intact.   Cardiovascular:      Rate and Rhythm: Normal rate.      Pulses: Normal pulses.   Pulmonary:      Effort: Pulmonary effort is normal. No respiratory distress.      Breath sounds: No wheezing or rales.   Abdominal:      General: Abdomen is flat. There is no distension.      Palpations: Abdomen is soft. There is no mass.      Tenderness: There is abdominal tenderness (epigastric region). There is no guarding.   Skin:     General: Skin is warm and dry.      Coloration: Skin is not jaundiced.   Neurological:      Mental Status: He is alert and oriented to person, place, and time. Mental status is at baseline.   Psychiatric:         Mood and Affect: Mood normal.         Behavior: Behavior normal.                Significant Labs: All pertinent labs within the past 24 hours have been reviewed.    Significant Imaging: I have reviewed all pertinent imaging results/findings within the past 24 hours.    Assessment/Plan:      * Alcohol-induced acute pancreatitis without infection or necrosis  As evidenced by imaging findings on CT on 5/7, classic abdominal pain as well as Lipase 3x ULN.  - Supportive care  - Low fiber/low residue diet as patient reports an appetite   - S/p LR for 12 hours, eating and drinking well, will liberalize diet   - Strict alcohol cessation   - Follow up PETH level    Hyponatremia  Monitor     Hepatic steatosis  Likely component of alcohol use and MASLD  - Will encourage exercise and weight loss  - Alcohol cessation counseling  - Hepatology clinic follow up     T2DM (type 2 diabetes mellitus)  Patient's  FSGs are uncontrolled due to hyperglycemia on current medication regimen.  Last A1c reviewed-   Lab Results   Component Value Date    HGBA1C 10.9 (H) 05/07/2024     Most recent fingerstick glucose reviewed-   Recent Labs   Lab 05/07/24  1316 05/07/24 2025 05/08/24  0013 05/08/24  0827   POCTGLUCOSE 248* 292* 368* 258*       Current correctional scale  Low  Maintain anti-hyperglycemic dose as follows-   Antihyperglycemics (From admission, onward)      Start     Stop Route Frequency Ordered    05/08/24 0945  insulin glargine U-100 (Lantus) pen 7 Units         -- SubQ Daily 05/08/24 0839    05/07/24 1810  insulin aspart U-100 pen 0-10 Units         -- SubQ Before meals & nightly PRN 05/07/24 1711          Hold Oral hypoglycemics while patient is in the hospital.    Primary hypertension  Reports history of. No medications the patient is currently on.   - Start amlodipine 5 mg QD     Elevated LFTs  CT appears to show hepatic steatosis. Pattern is not typical that you would expect from EtOH alone. Hep panel negative.   - Trend daily CMP; Improving dramatically   - Liver US with doppler with 5.9 cm complex fluid collection adjacent to the inferior right hepatic lobe. Will have IR weigh in on management.     Alcohol use disorder  - Counseled cessation  - Addiction psych consulted on 5/8       VTE Risk Mitigation (From admission, onward)           Ordered     IP VTE LOW RISK PATIENT  Once         05/07/24 1652     Place sequential compression device  Until discontinued         05/07/24 1652                    Discharge Planning   MARSHA: 5/9/2024     Code Status: Full Code   Is the patient medically ready for discharge?:     Reason for patient still in hospital (select all that apply): Laboratory test, Treatment, and Consult recommendations         Francis Dhaliwal DO  Department of Hospital Medicine   [facility]

## 2024-05-08 NOTE — DISCHARGE INSTRUCTIONS
REFERRAL RECOMMENDATIONS FOR SUBSTANCE ABUSE & MENTAL HEALTH    [facility]: [address] [facility], [facility], [phone]        SUBSTANCE ABUSE:     [facility] REC[facility] (formerly known as the ABU)  [x] [phone], Option 2  [x] [address] [facility] [address] 4th Floor, [place] [address]  [x] [email or web address]  [x] The [facility] Recovery Program delivers comprehensive and collaborative treatment for alcohol and substance use disorders.  Excellent program for working professionals or anyone else seeking recovery.  [x] Requires insurance approval prior to starting program, call number above for more information.  [x] Intensive Outpatient Rehabilitation Program - M-F 9am-3pm - daily groups with psychologists and social workers, sessions with MDs 3x per week   [x] Ambulatory detox and dual diagnosis available          12 STEP PROGRAMS (and similar):     Alcoholics Anonymous (local)  [x] [phone]  [x] www.[place].org for schedules for in-person and online meetings  [x] There are AA meetings throughout the day all over [facility]  [x] AA costs nothing to attend; they pass a basket for donations but this is not required    Narcotics Anonymous  [x] [phone]  [x] www.noana.org  [x] There are NA meetings throughout the day all over [facility]  [x] NA costs nothing to attend; they pass a basket for donations but this is not required    Alcoholics Anonymous Online Intergroup (national)  [x] www.aa-intergroup.org  [x] Good resource for large, nation-wide meetings  [x] Can also attend smaller, local meetings in other cities  [x] Countless meetings all day and all night  [x] AA costs nothing to attend; they pass a basket for donations but this is not required    Flying Sober - 24/7 zoom meetings for women and coed - sign on anytime, anywhere!  [email or web address] [email or web address]-gavvyafh/    Online Intergroup of AA - 121 Open AA [place]  Meeting - 24/7 zoom meetings  https://aa-intergroup.org/meetings/    LOOKING FOR AN ALTERNATIVE TO 12 STEP PROGRAMS - check out:  SMART Recovery: https://www.smartrecovery.org/about-us  [facility] Recovery: https://recoverydharma.org      DETOX UNITS (USUALLY 5-7 DAYS):     [facility]: [address], [facility]  [phone], call first to ensure bed availability    [facility]: [address] [facility]  [phone], Option 1, call first to ensure bed availability    [facility] Center: [address] [place] , [facility]  [phone] (intake by appointment only)    [facility]: [address], [facility]  [phone]      INTENSIVE OUTPATIENT PROGRAMS:     [facility] RECOVERY PROGRAM (formerly known as the ABU)  [x] [phone], Option 2  [x] [address], [facility] 4th Floor, [facility] [address]  [x] [email or web address]  [x] [facility] Recovery Program delivers comprehensive and collaborative treatment for alcohol and substance use disorders.  Excellent program for working professionals or anyone else seeking recovery.  [x] Requires insurance approval prior to starting program, call number above for more information.  [x] Intensive Outpatient Rehabilitation Program - M-F 9am-3pm - daily groups with psychologists and social workers, sessions with MDs 3x per week   [x] Ambulatory detox and dual diagnosis available    [facility] Intensive [facility] Program  [x] [phone]  [x] [address] [facility] (the clinic not on [facility])  [x] Call number above for more info and to check insurance requirements    [facility]  [address] [place] [address] [phone]    [facility]:  [address] [facility] [place], [place] [address]?, [phone]  [address] [facility]?, [place], [place] [address]?, [phone]    RESIDENTIAL REHABS (USUALLY 28 DAYS):     [facility]: [address]  [phone]    [facility]: [address] , [facility]  [phone] (intake by appointment only)    [facility] (men only) [address] [facility], [phone]    [facility] (Female only) [address] [facility], [phone]    [facility]: [address], [facility], men's program [phone], women's program [phone]    [facility]: [address], [facility], [phone]    [facility]: [address] [place], [place], [phone]    [facility]: Men only, [phone], [address] [facility]: [address], [place], [place], [phone]    [facility]: [facility] [place], [place],  [phone]  New Location: [address] [place] [address] [address] [address], [phone]    [facility]:   ?[address], [place] [address]?[phone]    [facility]: [address] [place] [address], [phone]    [place]: [place], [place], [phone]     [facility]: [place], [place], [phone]    [facility]: [place], [place], [phone]    [facility]: [place], [place], [phone]    [place]: [place], [place], [phone]    [facility]: [address] [facility], [place] [address], [phone]    COMMUNITY ADDICTION CLINICS:     [facility]: [address] [place], [phone] -or- [address] [place] [address]    [facility] [place]: [address] [place]  [address]     [facility]: Clinics [phone]; Crisis [phone]    [facility]: [address] [place] [address]    [facility]: [address] [place] [address]    [facility]: [address], [place] [address]: 2nd Floor [address] [place] [address]    [facility]: [address], [place] [address]    [facility], [place] [address]    [facility]: [address] [facility], [facility] [phone]  (serves youth 16-23 years old)    [facility]: [facility]/[place] [phone]    [facility]: [address] [facility], [place] [phone]    [facility]: [address] [facility] [phone]    [facility]: [address] [facility] [facility], [address], [phone]     [facility]: [address] [place] [address], [phone], [phone]    MENTAL HEALTH:     [facility] Outpatient Clinic  [phone]    [facility] - General Psychiatry Intensive Outpatient Program  [facility]ness [facility] (formerly known as the BMU)  [phone], option 3    [facility] - Dual Diagnosis Intensive Outpatient Program  [facility] Recovery [facility] (formerly known as the ABU)  [phone], option 2      [facility] MENTAL HEALTH CENTERS:     [facility]  (aka [facility], aka [facility])  Serves [facility].  Serves uninsured patients & those with Medicaid.  Main location: [address] [place], [place] [address] [phone]  Walk-in's available during regular business hours.  24/7 Crisis Line: [phone]    [facility]  (aka [facility], [facility])  Serves [facility].  Serves uninsured patients, those with Medicaid and some private plans.  Walk-in's available during regular business hours.  Primary care services available as well.  [facility]: [address] [place] [address];  [phone]  [place]: [address] [place] [address];  [phone]  24/7 Crisis Line:  [phone]    [facility]  Serves uninsured patients & those with Medicaid, call for more info.  Primary care, pediatrics, HIV treatment, and dentistry services available as well.  Three locations.  [phone]    [facility] [place]Corporate Office  Serves patients with Medicaid, Medicare, and private insurance  [address]  [place],?[place] [address]  [phone]    [facility]  Serves uninsured on a sliding scale, as well as Medicaid, Medicare, and private plans.  Eight locations around the [facility].  [phone]    [facility]  Serves uninsured patients & those with Medicaid, private insurances.  Primary care available as well.  [phone]  [address] [place] [address]    [facility]  Serves veterans who were honorably discharged.  [address] [place] [address]  [phone]  24/7 Veterans Crisis Line: [phone] (Press 1)    If you have private insurance and need to find a specialist, please contact your insurance network to request a list of providers covered by your benefits.      MENTAL HEALTH/ADDICTIVE DISORDERS:     AA ([phone]), NA ([phone])   National Suicide Prevention Lifeline- Call 1-936.428.6831 Available 24 hours everyday  [facility] [phone]; Crisis Line [phone] - Call for options A-F:  Intensive Outpatient Treatment/ Day programs   [facility], please contact   [facility], please contact [phone] or [phone] to speak with an admissions counselor.  [facility] (Methadone Maintenance)   [address] [place] [address], [phone]  [address] [place] [address] [phone]  [facility], [address] [address], [phone]  [facility] [place], [place] [phone]  [facility] [place] please  contact [phone]  [facility], [address], 4th [place] [address] Phone: [phone]   [facility]  [place] Office: [address] [place] [address], [phone]  [place] Office: [address] [facility], [address], [place] [address], [phone]  [place] Office: [address] [place] [address] [phone]    Outpatient Substance Abuse Treatment   [facility] (Methadone Maintenance)   [address] [place] [address], [phone]  [address], [place] [address] [phone]  [facility], [address] [place] [address], [phone]  [facility]  [place] Office: [address] [place] [address], [phone]  [place] Office: [address] [facility], [address], [place] [address], [phone]  [place] Office: [address] [place] [address] [phone]  [facility] [facility], [address] [place] [address] [phone]   [facility], [address] [facility], [address], [phone]  [facility], [address] [place], [place] [phone]    Residential Substance Abuse Treatment   [facility] [address] [facility], [phone]  [facility], [address] [facility], [phone]  [facility] (men only) [address] [place] [address], [phone]  [facility] (women only) [address] [place] [address] [phone]  [facility], [address] [facility] [place] [address] [phone]  [facility] (women only), intakes at [address] [facility], [phone]  [facility] (7-day program, $100, [address] [place], [phone], [phone], [phone])  [place] [facility] (Men only, [phone]), [address], [place] (Vets*/Non-Vets)  [facility] (Men only, $400/month program fee) [address] [facility] [address], [phone]  [facility] (Women over age 39 only),  [address] [facility] [address] [facility] [address] [facility], [phone]    Out of Area:    [place], [address] [facility] [address], [place], [place] ([phone])  [facility] (men only), [address] [place] [address], [phone]  [facility], [address] [place], [place] ([phone])  [place], [facility] [place], [place] ([phone])  [facility] [place], [address] [facility], [phone]  [facility] (Women only, has to have mental illness, can be homeless or substance abuser), [phone]        IN CASE OF SUICIDAL THINKING, call the National Suicide Hotline Number: 988    988 Suicide & Crisis Lifeline: 988 , 4-267-749-TALK (5371)  Provides 24/7, free and confidential support for people in distress, prevention and crisis resources for you or your loved ones, and best practices for professionals.    Call, text or chat.  https://988lifeline.org

## 2024-05-08 NOTE — PLAN OF CARE
[facility] - Med Surg  Initial Discharge Assessment       Primary Care Provider: Marisel, Primary Doctor    Admission Diagnosis: Epigastric abdominal pain [R10.13]  Uncomplicated alcohol dependence [F10.20]  Abdominal pain [R10.9]  Chest pain [R07.9]  Alcohol-induced acute pancreatitis without infection or necrosis [K85.20]  Nausea and vomiting, unspecified vomiting type [R11.2]    Admission Date: 5/7/2024  Expected Discharge Date: 5/9/2024    Transition of Care Barriers: (P) None    Payor: /     Extended Emergency Contact Information  Primary Emergency Contact: ela richard  Mobile Phone: [phone]  Relation: Brother    Discharge Plan A: (P) Home with family  Discharge Plan B: (P) Home    No Pharmacies Listed      CM met with patient to discuss discharge planning. Patient lives in a second story apartment with thelve steps to enter. Prior to hospital admission, patient was independent with ADLs and states that he does not use medical equipment. Will continue to follow for post acute needs. Discharge Plan A and Plan B have been determined by review of patient's clinical status, future medical and therapeutic needs, and coverage/benefits for post-acute care in coordination with multidisciplinary team members.  Initial Assessment (most recent)       Adult Discharge Assessment - 05/08/24 1530          Discharge Assessment    Assessment Type Discharge Planning Assessment (P)      Confirmed/corrected address, phone number and insurance Yes (P)      Confirmed Demographics Correct on Facesheet (P)      Source of Information patient (P)      Communicated MARSHA with patient/caregiver Date not available/Unable to determine (P)      Reason For Admission Alcohol-induced acute pancreatitis without infection or necrosis (P)      People in Home sibling(s) (P)      Facility Arrived From: home (P)      Do you expect to return to your current living situation? Yes (P)      Do you have help at home or someone to help you manage your care at  home? No (P)      Prior to hospitilization cognitive status: Alert/Oriented (P)      Current cognitive status: Alert/Oriented (P)      Walking or Climbing Stairs Difficulty no (P)      Dressing/Bathing Difficulty no (P)      Home Accessibility stairs to enter home (P)      Number of Stairs, Main Entrance other (see comments) (P)    12 steps    Home Layout Bedroom on 2nd floor (P)      Equipment Currently Used at Home none (P)      Readmission within 30 days? No (P)      Patient currently being followed by outpatient case management? No (P)      Do you currently have service(s) that help you manage your care at home? No (P)      Do you take prescription medications? No (P)      Do you have prescription coverage? No (P)      Do you have any problems affording any of your prescribed medications? No (P)      Is the patient taking medications as prescribed? no (P)      How do you get to doctors appointments? public transportation (P)      Are you on dialysis? No (P)      Do you take coumadin? No (P)      Discharge Plan A Home with family (P)      Discharge Plan B Home (P)      DME Needed Upon Discharge  none (P)      Discharge Plan discussed with: Patient (P)      Transition of Care Barriers None (P)         Physical Activity    On average, how many days per week do you engage in moderate to strenuous exercise (like a brisk walk)? 0 days (P)      On average, how many minutes do you engage in exercise at this level? 0 min (P)         Financial Resource Strain    How hard is it for you to pay for the very basics like food, housing, medical care, and heating? Not very hard (P)         Housing Stability    In the last 12 months, was there a time when you were not able to pay the mortgage or rent on time? No (P)      At any time in the past 12 months, were you homeless or living in a shelter (including now)? No (P)         Transportation Needs    Has the lack of transportation kept you from medical appointments, meetings,  work or from getting things needed for daily living? No (P)         Food Insecurity    Within the past 12 months, you worried that your food would run out before you got the money to buy more. Never true (P)      Within the past 12 months, the food you bought just didn't last and you didn't have money to get more. Never true (P)         Stress    Do you feel stress - tense, restless, nervous, or anxious, or unable to sleep at night because your mind is troubled all the time - these days? To some extent (P)         Social Isolation    How often do you feel lonely or isolated from those around you?  Never (P)         Alcohol Use    Q1: How often do you have a drink containing alcohol? Never (P)      Q2: How many drinks containing alcohol do you have on a typical day when you are drinking? Patient does not drink (P)      Q3: How often do you have six or more drinks on one occasion? Never (P)         Utilities    In the past 12 months has the electric, gas, oil, or water company threatened to shut off services in your home? No (P)         Health Literacy    How often do you need to have someone help you when you read instructions, pamphlets, or other written material from your doctor or pharmacy? Rarely (P)         OTHER    Name(s) of People in Home Latha Ferguson (brother) [phone] (P)                         YEN Ozuna  Case Management  [phone]

## 2024-05-08 NOTE — NURSING
Nurses Note -- 4 Eyes      5/8/2024   6:55 AM      Skin assessed during: Admit      [x] No Altered Skin Integrity Present    []Prevention Measures Documented      [] Yes- Altered Skin Integrity Present or Discovered   [] LDA Added if Not in Epic (Describe Wound)   [] New Altered Skin Integrity was Present on Admit and Documented in LDA   [] Wound Image Taken    Wound Care Consulted? No    Attending Nurse:  Lili Kwong RN/Staff Member:   Alex Arias RN

## 2024-05-08 NOTE — ASSESSMENT & PLAN NOTE
As evidenced by imaging findings on CT on 5/7, classic abdominal pain as well as Lipase 3x ULN.  - Supportive care  - Low fiber/low residue diet as patient reports an appetite   - S/p LR for 12 hours, eating and drinking well, will liberalize diet   - Strict alcohol cessation   - Follow up PETH level

## 2024-05-08 NOTE — ASSESSMENT & PLAN NOTE
CT appears to show hepatic steatosis. Pattern is not typical that you would expect from EtOH alone. Hep panel negative.   - Trend daily CMP; Improving dramatically   - Liver US with doppler with 5.9 cm complex fluid collection adjacent to the inferior right hepatic lobe. Will have IR weigh in on management.

## 2024-05-08 NOTE — PROGRESS NOTES
"[facility] - Med Surg  Adult Nutrition  Progress Note    SUMMARY       Recommendations    1. Continue Low fiber/residue diet. Add 2000 Calorie Diabetic Restriction.   2. Recommend MVI, thiamine and folic acid supplementation.   3. Recommend Boost GC BID for additional calories/PRO.   4. RD following.    Goals: Meet % EEN/EPN by follow-up date.  Nutrition Goal Status: new  Communication of RD Recs: other (comment) (POC)    Assessment and Plan    Nutrition Problem  Excessive CHO intake     Related to (etiology):   Disordered eating patterns    Signs and Symptoms (as evidenced by):   A1C 10.9%     Interventions/Recommendations (treatment strategy):  Collaboration of nutrition care with other providers    Nutrition Diagnosis Status:   New       Reason for Assessment    Reason For Assessment: identified at risk by screening criteria (A1C)  Diagnosis:  (Alcohol-induced acute pancreatitis without infection or necrosis)  Relevant Medical History: HTN, DM2, EtOH abuse  Interdisciplinary Rounds: did not attend  General Information Comments: RD triggered by A1C. Pmh alcohol abuse, HTN and DM. Patient is at nutrition risk. Report no further N/V/abd pain controlled with meds. Appetite good, consumed 75% of breakfast tray. DM nutrition education with handout provided. RD recommends outpatient diabetic management clinic referral for further education.  Nutrition Discharge Planning: Pending Clinical Course    Nutrition Risk Screen    Nutrition Risk Screen: no indicators present    Nutrition/Diet History    Patient Reported Diet/Restrictions/Preferences: general  Spiritual, Cultural Beliefs, Zoroastrianism Practices, Values that Affect Care: no  Food Allergies: NKFA  Factors Affecting Nutritional Intake: abdominal pain, nausea/vomiting    Anthropometrics    Temp: 97.9 °F (36.6 °C)  Height Method: Stated  Height: 5' 8" (172.7 cm)  Height (inches): 68 in  Weight Method: Bed Scale  Weight: 103.7 kg (228 lb 10.6 oz)  Weight (lb): 228.66 " lb  Ideal Body Weight (IBW), Male: 154 lb  % Ideal Body Weight, Male (lb): 148.48 %  BMI (Calculated): 34.8       Lab/Procedures/Meds    Pertinent Labs Reviewed: reviewed  Pertinent Labs Comments: Na 131, CO2 15, Glu 271, Tbili 1.3, , , A1C 10.9%  Pertinent Medications Reviewed: reviewed  Pertinent Medications Comments: amlodipine, insulin, polyethylene glycol    Estimated/Assessed Needs    Weight Used For Calorie Calculations: 103.4 kg (228 lb)  Energy Calorie Requirements (kcal): 2100 kcal/d  Energy Need Method: [place]-St Jeor (MSJ x 1.1)  Protein Requirements: 84-105g (1.2-1.5 g/kg)  Weight Used For Protein Calculations: 69.9 kg (154 lb) (IBW)        RDA Method (mL): 2100         Nutrition Prescription Ordered    Current Diet Order: Low Fiber/Residue    Evaluation of Received Nutrient/Fluid Intake    I/O: +1.00 L  Comments: LBM: 5/7  Tolerance: tolerating  % Intake of Estimated Energy Needs: 75 - 100 %  % Meal Intake: 75 - 100 %    Nutrition Risk    Level of Risk/Frequency of Follow-up: low (1x/ week)     Monitor and Evaluation    Food and Nutrient Intake: energy intake, food and beverage intake  Food and Nutrient Adminstration: diet order  Physical Activity and Function: nutrition-related ADLs and IADLs, factors affecting access to physical activity  Anthropometric Measurements: weight change, weight, body mass index  Biochemical Data, Medical Tests and Procedures: lipid profile, inflammatory profile, glucose/endocrine profile, gastrointestinal profile, electrolyte and renal panel  Nutrition-Focused Physical Findings: head and eyes, extremities, muscles and bones, overall appearance, skin     Nutrition Follow-Up    RD Follow-up?: Yes    Nickolas Welch MS, RD, LDN

## 2024-05-08 NOTE — CARE UPDATE
"RAPID RESPONSE NURSE CHART REVIEW        Chart Reviewed: 05/08/2024, 1:28 AM    MRN: [id number]  Bed: 629/629 A    Dx: Alcohol-induced acute pancreatitis without infection or necrosis    Harshad Dodson has no past medical history on file.    Last VS: /68   Pulse 106   Temp 97.8 °F (36.6 °C)   Resp 20   Wt 104.3 kg (230 lb)   SpO2 100%     24H Vital Sign Range:  Temp:  [97.8 °F (36.6 °C)-98.7 °F (37.1 °C)]   Pulse:  []   Resp:  [14-20]   BP: (110-211)/()   SpO2:  [97 %-100 %]     Level of Consciousness (AVPU): alert    Recent Labs     05/07/24  1329 05/07/24  1339   WBC 12.24  --    HGB 15.1  --    HCT 45.2 53     --        Recent Labs     05/07/24  1329   *   K 3.9   CL 99   CO2 21*   BUN 9   CREATININE 0.9   *        No results for input(s): "PH", "PCO2", "PO2", "HCO3", "POCSATURATED", "BE" in the last 72 hours.     OXYGEN:             MEWS score: 1    Rounding completed with bedside RN Mary for hypertension,  reports medication given, BP improved to 165/87. No additional concerns verbalized at this time. Instructed to call [phone] for further concerns or assistance.    DORIS Coon RN       "

## 2024-05-08 NOTE — HOSPITAL COURSE
Patient admitted to hospital medicine on 5/7 for acute pancreatitis 2/2 alcohol use as evidenced by imaging findings on CT on 5/7, classic abdominal pain as well as Lipase 3x ULN. Started on IVF's and low fiber, low residue diet. Tolerated po intake well on 5/8 so fluids discontinued. Abdominal pain persists, but mild in quality. US Liver obtained to rule out gallstone disease/burden as well given noted elevated LFT's in the 500's. US Liver noting the liver is normal in size, measures 16 cm. The liver demonstrates homogeneous echo texture with no focal hepatic lesions seen. No biliary ductal dilatation. However, 5.9 cm complex fluid collection adjacent to the inferior right hepatic lobe was noted. IR consulted to weigh in on need for possible drainage vs outpatient repeat interval imaging. IR notes that they do not think this structure is amenable to drainage at this time as there is no defined structure on CT. Clinically without fevers, chills or white count. No interval imaging needed, but warning signs to seek further medical care including severe abdominal pain, fevers or chills discussed with the patient. LFT's trending down daily.     Patient medically ready for discharge on 5/9. Diabetic supplies/kit ordered for the patient. Diabetic teaching with bedside nursing completed prior to discharge. Medications reconciled and patient to follow up with PCP and endocrinology outpatient. Pending PETH at discharge. Repeat CMP in 1 week.

## 2024-05-08 NOTE — ASSESSMENT & PLAN NOTE
Patient's FSGs are uncontrolled due to hyperglycemia on current medication regimen.  Last A1c reviewed-   Lab Results   Component Value Date    HGBA1C 10.9 (H) 05/07/2024     Most recent fingerstick glucose reviewed-   Recent Labs   Lab 05/07/24  1316 05/07/24 2025 05/08/24  0013 05/08/24  0827   POCTGLUCOSE 248* 292* 368* 258*       Current correctional scale  Low  Maintain anti-hyperglycemic dose as follows-   Antihyperglycemics (From admission, onward)      Start     Stop Route Frequency Ordered    05/08/24 0945  insulin glargine U-100 (Lantus) pen 7 Units         -- SubQ Daily 05/08/24 0839    05/07/24 1810  insulin aspart U-100 pen 0-10 Units         -- SubQ Before meals & nightly PRN 05/07/24 1711          Hold Oral hypoglycemics while patient is in the hospital.

## 2024-05-08 NOTE — PLAN OF CARE
Recommendations    1. Continue Low fiber/residue diet. Add 2000 Calorie Diabetic Restriction.   2. Recommend MVI, thiamine and folic acid supplementation.   3. Recommend Boost GC BID for additional calories/PRO.   4. RD following.    Goals: Meet % EEN/EPN by follow-up date.  Nutrition Goal Status: new  Communication of RD Recs: other (comment) (POC)

## 2024-05-08 NOTE — CONSULTS
The patient is 44 y/o male with a hx of DM, HTN and EtOH use who was admitted on 5/7/2024 for epigastric abdominal pain, pancreatitis. Being treated with IVF for pancreatitis. Remains afebrile without leukocytosis during admission.     CT AP done revealed acute pancreatitis. Ultrasound of the liver was read as a new 5.9 cm complex collection adjacent to the inferior R hepatic lobe. IR consulted for evaluation. Imaging reviewed by IR staff - CT AP with no defined fluid collection. Would not recommend aspiration at this time given no fluid collection. Recommend following up with repeat imaging as indicated based on clinical suspicion. D/w ordering provider.     Shruti Burk PA-C  Interventional Radiology  Spectra: [id number]  5/8/2024

## 2024-05-08 NOTE — PLAN OF CARE
APPOINTMENT:    Necessary Specialty Appointments: Endocrinology     Provider requires schedule review by Office Nurse - Office to call patient with date and time.

## 2024-05-08 NOTE — CARE UPDATE
I have reviewed the chart of Harshad Dodson who is hospitalized for the following:    Active Hospital Problems    Diagnosis    *Alcohol-induced acute pancreatitis without infection or necrosis    Hyponatremia     Na 131, trend, provide IVF      Alcohol use disorder    Elevated LFTs    Primary hypertension    T2DM (type 2 diabetes mellitus)    Hepatic steatosis        Ching Jaeger PA-C  [facility]

## 2024-05-08 NOTE — CONSULTS
"{  ADHD   AIMS   AUDIT   AUDIT-C   C-SSRS (Screen)   C-SSRS (Short)   C-SSRS (Full)   DAST   DAST-10   AP-7   MMSE   MOCA   PCL-5   PHQ-9   SERGIO   YMRS   :[id number]}  INITIAL VISIT: ADDICTION PSYCHIATRY CONSULTATION SERVICE      ASSESSMENT AND PLAN:     DIAGNOSES & PROBLEMS:  Alcohol Use Disorder  Cannabis Use Disorder    In Summary:  Mr. Dodson is a 43 year old man with Alcohol Use Disorder, Hypertension, and Diabetes Mellitus Type II who was admitted for treatment of Alcohol Induced Acute Pancreatitis without infection or necrosis.     Plan:  ***  {Dispo and Management Options:82841::" "," "}    PRESENTATION:     Harshad Dodson presents with the following chief complaint: problematic substance use/abuse and alcohol and/or drug addiction    Per Chart:  Emergency Department Physician History and Physical   Patient is a 43-year-old male with past medical history of DM2, alcohol dependence who presents with epigastric pain that started yesterday and worsened today.  He notes diarrhea yesterday and nausea with vomiting today.  He denies any bloody or coffee ground emesis.  He notes he wanted to take ibuprofen for his symptoms today but his blood sugar was elevated and he did not want to take it on an empty stomach.  He is a non-insulin dependent diabetic and was on pills however he stopped going to the doctor  and taking his medication last year.  He notes he drank 6 small fire balls and 2, 16 oz beer daily.  He would like to quit drinking as he reports he is using it has a clutch.  He has never been to rehab before, and he denies a history of complicated withdrawal including seizures or DTs.    Hospital Medicine History and Physical   The patient is a 42 y/o M with NIDDM (unsure of his last A1c, off all therapies for some time), HTN and EtOH use disorder that [facility] with 12 hours of epigastric abdominal pain. He notes that the pain came all of a sudden and was 10/10, localized to the epigastric region without " radiation. Abdominal pain was associated with nausea and vomiting. He reports bilious emesis. 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 Drink 5-6 shots of fireball daily + 2 -3 beers. He lives with his brother in [facility]. His brother brought him to the hospital today. He has not taking any medicines for diabetes or HTN in some time, but he is not sure for how long. States he was on insulin when he was first diagnosed with diabetes in 2013 or so, but was off insulin and on oral meds within 1-2 years. He denies any fevers, chills, cough, congestion, HA, palpitations or urinary symptoms. He does have his gallbladder and appendix. Last drink was 1-2 nights ago. He is not yet interested in meeting with addiction psychiatry.      Per Patient:  Psychiatry night float resident 5/8/24 @1945  The patient was greeted upon entering the room.  The patient was noted to be laying in bed dressed in hospital gown.  The patient responded to the interviewer's greeting and engaged in the psychiatric assessment.  The patient was notified that the primary team had asked for this team to speak with the patient regarding resources for substance use rehabilitation.  The patient stated that he was open to discussing resources.  The patient states that he has been drinking alcohol since his teenage years.  He states that his alcohol consumption became problematic during COVID.  He states that he has been drinking 2 fireball drinks with cold drink mixed and 2 beers nightly.  He states that he has been doing this every night for over 2 years.  He states that when he attempts to cut down on the drinking it causes insomnia and anxiety.  The patient states that he also smokes cannabis and tobacco products.  The patient denies any other illicit  substance use including IV drug use.  The patient denies ever having any experiences with substance abuse rehabilitation including participation in 12 step programs such as [facility].  The patient denies any history of any legal issues regarded to substance use.  The patient denies any significant symptoms of withdrawal when attempting to cut down on his alcohol use other than insomnia and mild anxiety.  He denies any history of significant tremors, altered mental status, auditory or visual hallucinations, or seizures when he has decreased his alcohol consumption in the past.  The patient states that he is interested in an outpatient program however is not interested in intensive inpatient program or anything that would interfere with his work schedule during the day.  The patient does state that there are other family members who suffer with substance use issues.    The patient does report some mild feelings of depression, insomnia as stated above, denies any issues with appetite, denies any feelings of guilt, feelings of worthlessness, does endorse issues with energy and concentration, but denies any suicidal ideation, homicidal ideation, history of suicide attempts, or history of psychiatric hospitalizations.  The patient denies any history of mental health disorders including seeing any outpatient mental health providers or taking any prescribed psychiatric medications.  The patient denies any history of symptoms of ulisses, symptoms of psychosis including auditory or visual hallucinations, paranoia, or feelings of being unsafe.  The patient denies any history of trauma.  The patient denies any history of head injuries or seizures.  The patient is interested in receiving information regarding resources for outpatient substance abuse rehabilitation that would not interfere with his ability to work and attend other activities.    Collateral:   Latha Ferguson(Brother)  [phone] ([place])      REVIEW OF  SYSTEMS:  I[x]I Patient denies any pertinent ROS, and none is known.  I[]I Patient unable or unwilling to provide any ROS.    [x] Y  [] N  sleep disturbance (due to pain from pancreatitis)  [x] Y  [] N  appetite/weight change (due to pain from pancreatitis)  [x] Y  [] N  fatigue/anergia  [x] Y  [] N  impairment in focus/concentration  [] Y  [x] N  depression  [] Y  [x] N  anxiety/worry  [x] Y  [] N  dysregulated mood/behavior  [] Y  [x] N  manic symptomatology  [] Y  [x] N  psychosis:    A pertinent medical review of systems was performed with the following notable findings: abdominal pain    CURRENT PSYCHOTROPIC REGIMEN:  I[]I Y  I[x]I N  I[]I U        ADDICTION:     I[x]I Y  I[]I N  I[]I U  I[]I Current  I[]I Former  Nicotine Use:   I[x]I Y  I[]I N  I[]I U  I[]I Current  I[]I Former  Alcohol Use:   I[x]I Y  I[]I N  I[]I U  I[]I Current  I[]I Former  Alcohol Misuse/Abuse:   I[x]I Y  I[]I N  I[]I U  I[]I Current  I[]I Former  Illicit Drug Use/Misuse/Abuse:   I[]I Y  I[x]I N  I[]I U  I[]I Current  I[]I Former  Misuse/Abuse of Rx Medications:   I[x]I Cannabis  I[]I Cocaine  I[]I Heroin  I[]I Meth  I[]I Opioids  I[]I Stimulants  I[]I Benzos  I[]I Other:     I[]I N/A  I[]I U  Substance(s) of Choice: Alcohol, Cannabis  I[]I N/A  I[]I U  Substance(s) Used Currently/Recently: Alcohol, Cannabis  I[]I N/A  I[]I U  Alcohol Consumption: daily or near daily, physiologically dependent   I[]I N/A  I[]I U  Last Drink: night before presentation  I[]I N/A  I[]I U  Last Drug Use: night before presentation  I[]I N/A  I[]I U  Duration of Sobriety/Abstinence: unable to recall    I[]I Y  I[x]I N  I[]I U  Hx of Detox:   I[]I Y  I[x]I N  I[]I U  Hx of Rehab:   I[]I Y  I[x]I N  I[]I U  Hx of IVDU:   I[]I Y  I[x]I N  I[]I U  Hx of Accidental Overdose:   I[]I Y  I[x]I N  I[]I U  Hx of DUI:   I[]I Y  I[x]I N  I[]I U  Hx of Complicated Withdrawal (i.e. Seizures and/or Delirium Tremens):   I[]I Y  I[x]I N   "I[]I U  Hx of Known/Suspected Substance-Induced Psychiatric Disorder:   I[]I Y  I[x]I N  I[]I U  Hx of Medication Assisted Treatment:   I[]I Y  I[x]I N  I[]I U  Hx of Twelve Step Program (or Equivalent) Involvement:   I[]I Y  I[x]I N  I[]I U  Currently Exhibits Signs of Intoxication:   I[]I Y  I[x]I N  I[]I U  Currently Exhibits Signs of Withdrawal:   I[]I Y  I[x]I N  I[]I U  Currently Active in Recovery:   I[x]I Y  I[]I N  I[]I U  Social Support:   I[]I Y  I[x]I N  I[]I U  Spouse/Partner Consumption:     I[]I N/A  I[x]I Y  I[]I N  I[]I U  Acknowledges/Accepts Addiction:   I[]I N/A  I[x]I Y  I[]I N  I[]I U  Advised to Quit/Cut Back:   I[]I N/A  I[x]I Y  I[]I N  I[]I U  Alcohol/Drug Cessation ("Wants to Quit"): Interested in Quitting, Advised to Quit , Encouragement Provided, Motivational Interviewing Provided, Resources Provided  I[]I N/A  I[]I Y  I[]I N  I[]I U  Motivation to Pursue Treatment: High, motivated by physical consequences, Willing to Engage in Twelve Step Program (or Equivalent) But Not Formal Treatment  I[]I N/A  I[x]I Y  I[]I N  I[]I U  Tobacco Cessation ("Wants to Quit"): interested in quitting, encouragement provided    DSM-5-TR SUBSTANCE USE DISORDER CRITERIA:     -- Impaired Control:  I[x]I Y  I[]I N  I[]I U  I[]I A  I[]I D  Often take in larger amounts or over a longer period of time than was intended:   I[x]I Y  I[]I N  I[]I U  I[]I A  I[]I D  Persistent desire or unsuccessful efforts to cut down or control use:   I[x]I Y  I[]I N  I[]I U  I[]I A  I[]I D  Great deal of time spent in activities necessary to obtain substance, use, or recover from effects:   I[x]I Y  I[]I N  I[]I U  I[]I A  I[]I D  Craving/strong desire for substance or urge to use:   -- Social Impairment:  I[x]I Y  I[]I N  I[]I U  I[]I A  I[]I D  Use resulting in failure to fulfill major role obligations at home, work or school:   I[x]I Y  I[]I N  I[]I U  I[]I A  I[]I D  Social, occupational, recreational activities decreased " because of use:   I[x]I Y  I[]I N  I[]I U  I[]I A  I[]I D  Continued use despite having persistent or recurrent social or interpersonal problems caused or exacerbated by the substance:   -- Risky Use:  I[]I Y  I[]I N  I[]I U  I[]I A  I[]I D  Recurrent use in situations in which it is physically hazardous:   I[]I Y  I[]I N  I[]I U  I[]I A  I[]I D  Use despite physical or psychological problems that are likely to have been caused or exacerbated by the substance:   -- Neuroadaptation:  I[x]I Y  I[]I N  I[]I U  I[]I A  I[]I D  Tolerance, as defined by either of the following: (1) a need for markedly increased amounts of substance to achieve intoxication or desired effect.  -OR- (2) a markedly diminished effect with continued use of the same amount of substance:   I[x]I Y  I[]I N  I[]I U  I[]I A  I[]I D  Withdrawal, as manifested by either of the following: (1) the characteristic withdrawal syndrome for substance.  -OR- (2) substance is taken to relieve or avoid withdrawal symptoms:   -- Mild (1-3), Moderate (4-5), Severe (?6)    I[]I N/A  I[x]I Y  I[]I N  I[]I U  I[]I A  I[]I D  Active Substance Use Disorder:       HISTORY:     I[]I Patient denies any history, and none is known.  I[]I Patient unable or unwilling to provide any history.    I[]I Y  I[x]I N  I[]I U  Psychiatric Diagnoses:   I[]I Y  I[x]I N  I[]I U  Current Psychiatric Provider (if Applicable):   I[]I Y  I[x]I N  I[]I U  Hx of Psychiatric Hospitalization:   I[]I Y  I[x]I N  I[]I U  Hx of Outpatient Psychiatric Treatment (psychiatry/psychotherapy):   I[]I Y  I[x]I N  I[]I U  Psychotropic Trials:   I[]I Y  I[x]I N  I[]I U  Prior Suicide Attempts:   I[]I Y  I[x]I N  I[]I U  Hx of Suicidal Ideation:   I[]I Y  I[x]I N  I[]I U  Hx of Homicidal Ideation:   I[]I Y  I[x]I N  I[]I U  Hx of Self-Injurious Behavior (Non-Suicidal):   I[]I Y  I[x]I N  I[]I U  Hx of Violence:   I[]I Y  I[x]I N  I[]I U  Documented Hx of Malingering:     FAMILY HISTORY:  I[x]I Y  I[]I N   I[]I U    Multiple family members     I[]I Y  I[x]I N  I[]I U  Hx of Trauma/Neglect:   I[]I Y  I[x]I N  I[]I U  Hx of Physical Abuse:   I[]I Y  I[x]I N  I[]I U  Hx of Sexual Abuse:   I[x]I Y  I[]I N  I[]I U  Happy Childhood:   I[]I Y  I[x]I N  I[]I U  Significant Developmental Delay/Disability:   I[x]I Y  I[]I N  I[]I U  GED/High School Diploma or Beyond:   I[x]I Y  I[]I N  I[]I U  Currently Employed: works at [employer]  I[]I Y  I[x]I N  I[]I U  On or Applying for Disability:   I[x]I Y  I[]I N  I[]I U  Functions Independently:   I[x]I Y  I[]I N  I[]I U  Financially Stable:   I[x]I Y  I[]I N  I[]I U  Domiciled:   I[]I Y  I[x]I N  I[]I U  Lives Alone: lives with brother  I[x]I Y  I[]I N  I[]I U  Intact Support System: some family still lives out of state (did not return after [place])  I[x]I Y  I[]I N  I[]I U  Heterosexual/Cisgender:   I[]I Y  I[x]I N  I[]I U  Currently in a Romantic Relationship:   I[]I Y  I[x]I N  I[]I U  Ever :   I[]I Y  I[x]I N  I[]I U  Children/Dependents:   I[x]I Y  I[]I N  I[]I U  Anabaptist/Spiritual:   I[]I Y  I[x]I N  I[]I U   History:   I[]I Y  I[x]I N  I[]I U  Current Legal Issues:   I[]I Y  I[x]I N  I[]I U  Past Charges/Convictions:   I[]I Y  I[x]I N  I[]I U  Hx of Incarceration:   I[]I Y  I[x]I N  I[]I U  Access to a Gun?:   I[]I Y  I[x]I N  I[]I U  Hx of Seizure:   I[]I Y  I[x]I N  I[]I U  Hx of Significant Head Injury (e.g., Loss of Consciousness, Concussion, Coma):   I[x]I Y  I[]I N  I[]I U  Medical History & Diagnoses:       The patient's past medical history has been reviewed and updated as appropriate within the electronic medical record system.    Alcohol-induced acute pancreatitis without infection or necrosis    Alcohol use disorder    Elevated LFTs    Primary hypertension    T2DM (type 2 diabetes mellitus)    Hepatic steatosis    Hyponatremia     Scheduled and PRN Medications: The electronic chart was reviewed and updated as appropriate.  See [facility] for  details.    Current Facility-Administered Medications:     aluminum-magnesium hydroxide-simethicone 200-200-20 mg/5 mL suspension 30 mL, 30 mL, Oral, QID PRN, Whiteside, Mohammad, DO    amLODIPine tablet 5 mg, 5 mg, Oral, Daily, Whiteside, Mohammad, DO, 5 mg at 05/08/24 1014    dextrose 10% bolus 125 mL 125 mL, 12.5 g, Intravenous, PRN, Whiteside, Mohammad, DO    dextrose 10% bolus 250 mL 250 mL, 25 g, Intravenous, PRN, Whiteside, Mohammad, DO    folic acid tablet 1 mg, 1 mg, Oral, Daily, Whiteside, Mohammad, DO, 1 mg at 05/08/24 1507    glucagon (human recombinant) injection 1 mg, 1 mg, Intramuscular, PRN, Whiteside, Mohammad, DO    glucose chewable tablet 16 g, 16 g, Oral, PRN, Whiteside, Mohammad, DO    glucose chewable tablet 24 g, 24 g, Oral, PRN, Whiteside, Mohammad, DO    insulin aspart U-100 pen 0-10 Units, 0-10 Units, Subcutaneous, QID (AC + HS) PRN, Whiteside, Mohammad, DO, 2 Units at 05/08/24 1823    insulin aspart U-100 pen 6 Units, 6 Units, Subcutaneous, TIDWM, Whiteside, Mohammad, DO, 6 Units at 05/08/24 1823    insulin glargine U-100 (Lantus) pen 9 Units, 9 Units, Subcutaneous, BID, Whiteside, Mohammad, DO, 9 Units at 05/08/24 2006    melatonin tablet 6 mg, 6 mg, Oral, Nightly PRN, Whiteside, Mohammad, DO    morphine injection 3 mg, 3 mg, Intravenous, Q6H PRN, Whiteside, Mohammad, DO, 3 mg at 05/08/24 0003    multivitamin tablet, 1 tablet, Oral, Daily, Whiteside, Mohammad, DO, 1 tablet at 05/08/24 1507    naloxone 0.4 mg/mL injection 0.02 mg, 0.02 mg, Intravenous, PRN, Whiteside, Mohammad, DO    ondansetron injection 8 mg, 8 mg, Intravenous, Q8H PRN, Whiteside, Mohammad, DO    oxyCODONE immediate release tablet 5 mg, 5 mg, Oral, Q6H PRN, Whiteside, Mohammad, DO, 5 mg at 05/08/24 2004    polyethylene glycol packet 17 g, 17 g, Oral, Daily, Whiteside, Mohammad, DO, 17 g at 05/08/24 1014    promethazine tablet 25 mg, 25 mg, Oral, Q6H PRN, Whiteside, Mohammad, DO    sodium chloride 0.9% flush 10 mL, 10 mL, Intravenous, Q12H PRN, Whiteside, Mohammad, DO    thiamine  "tablet 100 mg, 100 mg, Oral, Daily, Francis Dhaliwal, DO, 100 mg at 05/08/24 1507    Allergies:  Patient has no known allergies.    PSYCHOSOCIAL FACTORS:  Stressors (Biopsychosocial, Cultural and Environmental): physical health, substance use/addiction  Functioning Relationships: good support system    STRENGTHS AND LIABILITIES:   Strength: Patient has positive support network.  Strength: Patient has reasonable judgment.  Strength: Patient is accepting of treatment.  Strength: Patient embraces recovery.  Liability: Patient is acutely decompensated.  Liability: Patient lacks coping skills    Additional Relevant History, As Applicable:       EXAMINATION:     /85   Pulse (!) 111   Temp 98.6 °F (37 °C) (Oral)   Resp 16   Ht 5' 8" (1.727 m)   Wt 103.7 kg (228 lb 10.6 oz)   SpO2 98%   BMI 34.77 kg/m²     MENTAL STATUS EXAMINATION:  General Appearance: adequately groomed, appropriately dressed, in no apparent distress, lying in bed, obese  Behavior:  normal; cooperative; reasonably friendly, pleasant, and polite; appropriate eye-contact; under good behavioral control  Involuntary Movements and Motor Activity:  no abnormal involuntary movements noted, no psychomotor agitation or retardation  Gait and Station:  unable to assess - patient lying down or seated  Speech and Language:  intact; normal rate, rhythm, volume, tone, and pitch; conversational, spontaneous, and coherent; speaks and understands English proficiently and fluently; repeats words and phrases, no word finding difficulties are noted  Mood: "Getting better"  Affect:  euthymic  Thought Process and Associations:  intact; linear, goal-directed, organized, and logical; no loosening of associations noted  Thought Content and Perceptions:  no suicidal or homicidal ideation, no auditory or visual hallucinations, no paranoid ideation, no ideas of reference, no evidence of delusions or psychosis  Sensorium:  intact; alert with clear sensorium; oriented " "fully to person, place, time and situation  Recent and Remote Memory:  grossly intact, no significant impairments noted  Attention and Concentration:  grossly intact, attentive to the conversation and not readily distractible  Fund of Knowledge:   Insight:  demonstrates awareness of illness and situation  Judgment:  intact, behavior is adequate/appropriate to the circumstances, compliant with health provider's recommendations and instructions      RISK MANAGEMENT:     I[]I Y  I[x]I N  I[]I U  I[]I A  Suicidal Ideation/Behavior:   I[]I Y  I[x]I N  I[]I U  I[]I A  Homicidal Ideation/Behavior:  I[]I Y  I[x]I N  I[]I U  I[]I A  Violence:  I[]I Y  I[x]I N  I[]I U  I[]I A  Self-Injurious Behavior:     The patient is deemed to be .    I[]I Y  I[x]I N  I[x]I U  I[]I A  I[]I N/A  Minimization of Risk Parameters Suspected/Evident:   I[]I Y  I[x]I N  I[]I U  I[]I A  I[]I N/A  Exaggeration of Risk Parameters Suspected/Evident:       [] Y  [x] N  Danger to Self:   [] Y  [x] N  Danger to Others:   [] Y  [x] N  Grave Disability:       In cases of emergency, daily coverage provided by Acute/ER Psych MD, NP, PA, or SW, with contact numbers located in [facility] On Call Schedule.    o[place] [place]  Department of Psychiatry  [facility]      KEY:     I[]I Y = Yes / Present / Endorses  I[]I N = No / Absent / Denies  I[]I U = Unknown / Unable to Assess / Unwilling to Participate  I[]I A = Ambiguity Exists / Accuracy Uncertain  I[]I D = Denial or Minimization is Suspected/Evident  I[]I N/A = Non-Applicable    CHART REVIEW:     Available documentation has been reviewed, and pertinent elements of the chart have been incorporated into this evaluation where appropriate.    The patient's last Epic encounter in the psychiatry department was on: Visit date not found  The patient's first Epic encounter in the psychiatry department was on:      [place]  [facility]  Site reviewed - { Options:79416::" "}  ***    ADVICE AND " COUNSELING:     [x] In cases of emergencies (e.g. SI/HI resulting in danger to self or others, functioning deteriorates to the level of grave disability), call 911 or 988, or present to the emergency department for immediate assistance.  [x] Individuals should not operate a motor vehicle or heavy machinery if effects of medications or underlying symptoms/condition impair the ability to safely do so.    Alcohol, Tobacco, and Drug Counseling, as well as resources, has been provided, as warranted.     Shared medical decision making and informed consent are the hallmark and bedrock of good clinical care, and as such have been employed and obtained, respectively, to the degree possible.      Risk Mitigation Strategies, Harm Reduction Techniques, and Safety Netting are important interventions that can reduce acute and chronic risk, and as such have been employed to the degree possible.    Prescription Drug Management entails the review, recommendation, or consideration without recommendation of medications, and as such was employed during the encounter.    Additional Psychoeducation has been provided, as warranted.    Discussed, to the extent possible, diagnosis, risks and benefits of proposed treatment vs alternative treatments vs no treatment, potential side effects of these treatments and the inherent unpredictability of treatment. {Ability to Consent:81303}    Written material*** has been provided to supplement, augment, and reinforce any discussions and interventions, via the AVS or other pre-printed handouts, as warranted.      DIAGNOSTIC TESTING:     The chart was reviewed for recent diagnostic procedures and investigations, and pertinent results are noted below.     Glu 271 (H)  5/8/2024  Li *   *  TSH *   *    HgA1c 10.9 (H)  5/7/2024  VPA *   *   FT4 *   *    Na 131 (L)  5/8/2024  CLZ *   *  WBC 9.75  5/8/2024    Cr 0.9  5/8/2024  ANC 7.6; 78.3;  (H)  5/8/2024   Hgb 16.4  5/8/2024     BUN 11   5/8/2024  Trop I *   *  HCT 50.2  5/8/2024     GFR >60.0  5/8/2024   CPK *   *    5/8/2024     Alb 3.2 (L)  5/8/2024   PRL *   *  B12 *   *     T Bili 1.3 (H)  5/8/2024  Chol *   *  B9 *   *    ALP 96  5/8/2024  TGs *   *  B1 *   *     (H)  5/8/2024  HDL *   *  Vit D *   *      (H)  5/8/2024  LDL *   *  HIV Non-reactive  5/7/2024     INR 1.1  5/7/2024  Arelis *   *   Hep C Non-reactive; Non-reactive;   5/7/2024    GGT *   *  Lip 227 (H)  5/7/2024  RPR *   *    MCV 79 (L)  5/8/2024   NH4 *   *  UPT *   *      PETH *   *  THC *   *    ETOH <10  5/7/2024  NAOMI *   *    EtG *   *  AMP *   *    ALC *   *  OPI *   *    BZO *   *  MTD *   *     BAR *   *  BUP *   *    PCP *   *  FEN *   *     Results for orders placed or performed during the hospital encounter of 05/07/24   EKG 12-lead    Collection Time: 05/07/24  1:22 PM   Result Value Ref Range    QRS Duration 76 ms    [facility] QTC Calculation 428 ms    Narrative    Test Reason : R10.9,    Vent. Rate : 089 BPM     Atrial Rate : 089 BPM     P-R Int : 132 ms          QRS Dur : 076 ms      QT Int : 352 ms       P-R-T Axes : 032 -14 049 degrees     QTc Int : 428 ms    Normal sinus rhythm  Minimal voltage criteria for LVH, may be normal variant ( R in aVL )  Borderline Abnormal ECG  No previous ECGs available  Confirmed by Fred ROBLEDO MD ([id number]) on 5/7/2024 2:38:10 PM    Referred By:             Confirmed By:Fred ROBLEDO MD       No results found for this or any previous visit.    CONSULTATION:     A diagnostic psychiatric evaluation was performed and responsiveness to treatment was assessed.  {XX-ResponseTX:15232}    Consults    {XX-Courtesy:39686}

## 2024-05-09 VITALS
TEMPERATURE: 98 F | SYSTOLIC BLOOD PRESSURE: 142 MMHG | WEIGHT: 228.69 LBS | HEIGHT: 68 IN | DIASTOLIC BLOOD PRESSURE: 84 MMHG | RESPIRATION RATE: 16 BRPM | HEART RATE: 107 BPM | BODY MASS INDEX: 34.66 KG/M2 | OXYGEN SATURATION: 97 %

## 2024-05-09 LAB
ALBUMIN SERPL BCP-MCNC: 2.9 G/DL (ref 3.5–5.2)
ALP SERPL-CCNC: 78 U/L (ref 55–135)
ALT SERPL W/O P-5'-P-CCNC: 205 U/L (ref 10–44)
ANION GAP SERPL CALC-SCNC: 10 MMOL/L (ref 8–16)
AST SERPL-CCNC: 48 U/L (ref 10–40)
BASOPHILS # BLD AUTO: 0.02 K/UL (ref 0–0.2)
BASOPHILS NFR BLD: 0.2 % (ref 0–1.9)
BILIRUB SERPL-MCNC: 1.1 MG/DL (ref 0.1–1)
BUN SERPL-MCNC: 11 MG/DL (ref 6–20)
CALCIUM SERPL-MCNC: 8.9 MG/DL (ref 8.7–10.5)
CHLORIDE SERPL-SCNC: 99 MMOL/L (ref 95–110)
CO2 SERPL-SCNC: 22 MMOL/L (ref 23–29)
CREAT SERPL-MCNC: 0.9 MG/DL (ref 0.5–1.4)
DIFFERENTIAL METHOD BLD: ABNORMAL
EOSINOPHIL # BLD AUTO: 0.1 K/UL (ref 0–0.5)
EOSINOPHIL NFR BLD: 0.5 % (ref 0–8)
ERYTHROCYTE [DISTWIDTH] IN BLOOD BY AUTOMATED COUNT: 13.8 % (ref 11.5–14.5)
EST. GFR  (NO RACE VARIABLE): >60 ML/MIN/1.73 M^2
GLUCOSE SERPL-MCNC: 184 MG/DL (ref 70–110)
HCT VFR BLD AUTO: 43.3 % (ref 40–54)
HGB BLD-MCNC: 14.4 G/DL (ref 14–18)
IMM GRANULOCYTES # BLD AUTO: 0.04 K/UL (ref 0–0.04)
IMM GRANULOCYTES NFR BLD AUTO: 0.4 % (ref 0–0.5)
LYMPHOCYTES # BLD AUTO: 1.6 K/UL (ref 1–4.8)
LYMPHOCYTES NFR BLD: 16.5 % (ref 18–48)
MCH RBC QN AUTO: 25 PG (ref 27–31)
MCHC RBC AUTO-ENTMCNC: 33.3 G/DL (ref 32–36)
MCV RBC AUTO: 75 FL (ref 82–98)
MONOCYTES # BLD AUTO: 0.7 K/UL (ref 0.3–1)
MONOCYTES NFR BLD: 7.1 % (ref 4–15)
NEUTROPHILS # BLD AUTO: 7.4 K/UL (ref 1.8–7.7)
NEUTROPHILS NFR BLD: 75.3 % (ref 38–73)
NRBC BLD-RTO: 0 /100 WBC
PLATELET # BLD AUTO: 212 K/UL (ref 150–450)
PMV BLD AUTO: 9.3 FL (ref 9.2–12.9)
POCT GLUCOSE: 170 MG/DL (ref 70–110)
POCT GLUCOSE: 263 MG/DL (ref 70–110)
POTASSIUM SERPL-SCNC: 3.8 MMOL/L (ref 3.5–5.1)
PROT SERPL-MCNC: 6.4 G/DL (ref 6–8.4)
RBC # BLD AUTO: 5.75 M/UL (ref 4.6–6.2)
SODIUM SERPL-SCNC: 131 MMOL/L (ref 136–145)
WBC # BLD AUTO: 9.8 K/UL (ref 3.9–12.7)

## 2024-05-09 PROCEDURE — 25000003 PHARM REV CODE 250: Performed by: STUDENT IN AN ORGANIZED HEALTH CARE EDUCATION/TRAINING PROGRAM

## 2024-05-09 PROCEDURE — 85025 COMPLETE CBC W/AUTO DIFF WBC: CPT | Performed by: STUDENT IN AN ORGANIZED HEALTH CARE EDUCATION/TRAINING PROGRAM

## 2024-05-09 PROCEDURE — 80053 COMPREHEN METABOLIC PANEL: CPT | Performed by: STUDENT IN AN ORGANIZED HEALTH CARE EDUCATION/TRAINING PROGRAM

## 2024-05-09 PROCEDURE — 36415 COLL VENOUS BLD VENIPUNCTURE: CPT | Performed by: STUDENT IN AN ORGANIZED HEALTH CARE EDUCATION/TRAINING PROGRAM

## 2024-05-09 PROCEDURE — 90792 PSYCH DIAG EVAL W/MED SRVCS: CPT | Mod: ,,, | Performed by: PSYCHIATRY & NEUROLOGY

## 2024-05-09 RX ORDER — INSULIN GLARGINE 100 [IU]/ML
18 INJECTION, SOLUTION SUBCUTANEOUS DAILY
Qty: 6 ML | Refills: 11 | Status: SHIPPED | OUTPATIENT
Start: 2024-05-09 | End: 2025-05-09

## 2024-05-09 RX ORDER — INSULIN GLARGINE 100 [IU]/ML
9 INJECTION, SOLUTION SUBCUTANEOUS 2 TIMES DAILY
Status: DISCONTINUED | OUTPATIENT
Start: 2024-05-09 | End: 2024-05-09 | Stop reason: HOSPADM

## 2024-05-09 RX ORDER — INSULIN GLARGINE 100 [IU]/ML
7 INJECTION, SOLUTION SUBCUTANEOUS 2 TIMES DAILY
Status: DISCONTINUED | OUTPATIENT
Start: 2024-05-09 | End: 2024-05-09

## 2024-05-09 RX ORDER — INSULIN ASPART 100 [IU]/ML
6 INJECTION, SOLUTION INTRAVENOUS; SUBCUTANEOUS 3 TIMES DAILY
Qty: 15 ML | Refills: 3 | Status: SHIPPED | OUTPATIENT
Start: 2024-05-09 | End: 2024-05-09

## 2024-05-09 RX ORDER — LANCETS 33 GAUGE
EACH MISCELLANEOUS
Qty: 100 EACH | Refills: 3 | Status: SHIPPED | OUTPATIENT
Start: 2024-05-09

## 2024-05-09 RX ORDER — INSULIN ASPART 100 [IU]/ML
5 INJECTION, SOLUTION INTRAVENOUS; SUBCUTANEOUS
Status: DISCONTINUED | OUTPATIENT
Start: 2024-05-09 | End: 2024-05-09

## 2024-05-09 RX ORDER — DEXTROSE 4 G
TABLET,CHEWABLE ORAL
Qty: 1 EACH | Refills: 0 | Status: SHIPPED | OUTPATIENT
Start: 2024-05-09

## 2024-05-09 RX ORDER — INSULIN GLARGINE 100 [IU]/ML
9 INJECTION, SOLUTION SUBCUTANEOUS 2 TIMES DAILY
Qty: 15 ML | Refills: 3 | Status: SHIPPED | OUTPATIENT
Start: 2024-05-09 | End: 2024-05-09

## 2024-05-09 RX ORDER — INSULIN ASPART 100 [IU]/ML
6 INJECTION, SOLUTION INTRAVENOUS; SUBCUTANEOUS
Status: DISCONTINUED | OUTPATIENT
Start: 2024-05-09 | End: 2024-05-09 | Stop reason: HOSPADM

## 2024-05-09 RX ORDER — PEN NEEDLE, DIABETIC 30 GX3/16"
NEEDLE, DISPOSABLE MISCELLANEOUS
Qty: 200 EACH | Refills: 3 | Status: SHIPPED | OUTPATIENT
Start: 2024-05-09

## 2024-05-09 RX ORDER — AMLODIPINE BESYLATE 2.5 MG/1
2.5 TABLET ORAL DAILY
Qty: 90 TABLET | Refills: 3 | Status: SHIPPED | OUTPATIENT
Start: 2024-05-10 | End: 2025-05-10

## 2024-05-09 RX ORDER — INSULIN ASPART 100 [IU]/ML
6 INJECTION, SOLUTION INTRAVENOUS; SUBCUTANEOUS 3 TIMES DAILY
Qty: 6 ML | Refills: 11 | Status: SHIPPED | OUTPATIENT
Start: 2024-05-09 | End: 2025-05-09

## 2024-05-09 RX ORDER — AMLODIPINE BESYLATE 2.5 MG/1
2.5 TABLET ORAL DAILY
Status: DISCONTINUED | OUTPATIENT
Start: 2024-05-09 | End: 2024-05-09 | Stop reason: HOSPADM

## 2024-05-09 RX ADMIN — INSULIN GLARGINE 7 UNITS: 100 INJECTION, SOLUTION SUBCUTANEOUS at 09:05

## 2024-05-09 RX ADMIN — Medication 100 MG: at 09:05

## 2024-05-09 RX ADMIN — INSULIN ASPART 6 UNITS: 100 INJECTION, SOLUTION INTRAVENOUS; SUBCUTANEOUS at 11:05

## 2024-05-09 RX ADMIN — INSULIN ASPART 2 UNITS: 100 INJECTION, SOLUTION INTRAVENOUS; SUBCUTANEOUS at 08:05

## 2024-05-09 RX ADMIN — AMLODIPINE BESYLATE 2.5 MG: 2.5 TABLET ORAL at 09:05

## 2024-05-09 RX ADMIN — THERA TABS 1 TABLET: TAB at 09:05

## 2024-05-09 RX ADMIN — FOLIC ACID 1 MG: 1 TABLET ORAL at 09:05

## 2024-05-09 NOTE — PLAN OF CARE
Sent email to [facility] to see if patient qualifies for Medicaid.    8050- Received response email from [facility] stating,   Per [place] Medicaid web site: verifying by SSI/, Name/, /SS: No coverage found: Room visit made, pt states he is single, employed, no minors, not claiming a disability, over income for expansion.      CM completed cost transfer with pharmacy for $125.51 for medications.     Set up [employer] ridfalguni: Name and number  [place], [phone]  Make and Model  [employer]  License plate  [id number]      Syed Lopez RNCM  Case Management  [phone]

## 2024-05-09 NOTE — PLAN OF CARE
APPOINTMENT:    Patient Appointment(s) scheduled with Lou Marin V  Wednesday May 22, 2024 hospital follow up at 2:00 pm

## 2024-05-09 NOTE — PLAN OF CARE
[facility] - Med Surg  Discharge Final Note    Primary Care Provider: Marisel, Primary Doctor    Expected Discharge Date: 5/9/2024      Patient discharged to home with no needs. Follow up appointment scheduled and placed in AVS. Discharge Plan A and Plan B have been determined by review of patient's clinical status, future medical and therapeutic needs, and coverage/benefits for post-acute care in coordination with multidisciplinary team members.  Final Discharge Note (most recent)       Final Note - 05/09/24 1704          Final Note    Assessment Type Final Discharge Note (P)      Anticipated Discharge Disposition Home or Self Care (P)      Hospital Resources/Appts/Education Provided Appointments scheduled and added to AVS (P)         Post-Acute Status    Discharge Delays None known at this time (P)                      Important Message from Medicare             Contact Info       Lou Marin NP   Specialty: Family Medicine    [address] [place] [address]   Phone: [phone]       Next Steps: Go on 5/22/2024    Instructions: hospital follow up at 2:00 pm            YEN Ozuna  Case Management  [phone]

## 2024-05-09 NOTE — CONSULTS
274}  INITIAL VISIT: ADDICTION PSYCHIATRY CONSULTATION SERVICE      ASSESSMENT AND PLAN:     DIAGNOSES & PROBLEMS:  Alcohol Use Disorder  Cannabis Use Disorder    In Summary:  Mr. Dodson is a 43 year old man with Alcohol Use Disorder, Hypertension, and Diabetes Mellitus Type II who was admitted for treatment of Alcohol Induced Acute Pancreatitis without infection or necrosis.     Plan:     -- ADDICTION ###  - options for medication-assisted treatment (MAT) for alcohol use disorder were discussed  - recommend patient enroll in addiction rehab program  - counseled on full abstinence from alcohol and substances of abuse (illicit and prescription)  - provided resources for various addiction rehabilitation options as part of aftercare planning  - psychotherapy was provided during the session  - augmentation with additional psychotherapy via a licensed counselor,  or psychologist is recommended: the patient is amenable  - follow with primary care provider for routine health maintenance and management of medical co-morbidities     -addiction psychiatry will sign off. Please reach out with any questions or concerns   PRESENTATION:     Harshad Dodson presents with the following chief complaint: problematic substance use/abuse and alcohol and/or drug addiction    Per Chart:  Emergency Department Physician History and Physical   Patient is a 43-year-old male with past medical history of DM2, alcohol dependence who presents with epigastric pain that started yesterday and worsened today.  He notes diarrhea yesterday and nausea with vomiting today.  He denies any bloody or coffee ground emesis.  He notes he wanted to take ibuprofen for his symptoms today but his blood sugar was elevated and he did not want to take it on an empty stomach.  He is a non-insulin dependent diabetic and was on pills however he stopped going to the doctor  and taking his medication last year.  He notes he drank 6 small fire balls and 2, 16 oz  beer daily.  He would like to quit drinking as he reports he is using it has a clutch.  He has never been to rehab before, and he denies a history of complicated withdrawal including seizures or DTs.    Hospital Medicine History and Physical   The patient is a 42 y/o M with NIDDM (unsure of his last A1c, off all therapies for some time), HTN and EtOH use disorder that [place] [facility] [place] hours of epigastric abdominal pain. He notes that the pain came all of a sudden and was 10/10, localized to the epigastric region without radiation. Abdominal pain was associated with nausea and vomiting. He reports bilious emesis. 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 Drink 5-6 shots of fireball daily + 2 -3 beers. He lives with his brother in [facility]. His brother brought him to the hospital today. He has not taking any medicines for diabetes or HTN in some time, but he is not sure for how long. States he was on insulin when he was first diagnosed with diabetes in 2013 or so, but was off insulin and on oral meds within 1-2 years. He denies any fevers, chills, cough, congestion, HA, palpitations or urinary symptoms. He does have his gallbladder and appendix. Last drink was 1-2 nights ago. He is not yet interested in meeting with addiction psychiatry.      Per Patient:  Psychiatry night float resident 5/8/24 @1945  The patient was greeted upon entering the room.  The patient was noted to be laying in bed dressed in hospital gown.  The patient responded to the interviewer's greeting and engaged in the psychiatric assessment.  The patient was notified that the primary team had asked for this team to speak with the patient regarding resources for substance use rehabilitation.  The patient stated that he was open to discussing  resources.  The patient states that he has been drinking alcohol since his teenage years.  He states that his alcohol consumption became problematic during COVID.  He states that he has been drinking 2 fireball drinks with cold drink mixed and 2 beers nightly.  He states that he has been doing this every night for over 2 years.  He states that when he attempts to cut down on the drinking it causes insomnia and anxiety.  The patient states that he also smokes cannabis and tobacco products.  The patient denies any other illicit substance use including IV drug use.  The patient denies ever having any experiences with substance abuse rehabilitation including participation in 12 step programs such as [facility].  The patient denies any history of any legal issues regarded to substance use.  The patient denies any significant symptoms of withdrawal when attempting to cut down on his alcohol use other than insomnia and mild anxiety.  He denies any history of significant tremors, altered mental status, auditory or visual hallucinations, or seizures when he has decreased his alcohol consumption in the past.  The patient states that he is interested in an outpatient program however is not interested in intensive inpatient program or anything that would interfere with his work schedule during the day.  The patient does state that there are other family members who suffer with substance use issues.    The patient does report some mild feelings of depression, insomnia as stated above, denies any issues with appetite, denies any feelings of guilt, feelings of worthlessness, does endorse issues with energy and concentration, but denies any suicidal ideation, homicidal ideation, history of suicide attempts, or history of psychiatric hospitalizations.  The patient denies any history of mental health disorders including seeing any outpatient mental health providers or taking any prescribed psychiatric medications.  The  patient denies any history of symptoms of ulisses, symptoms of psychosis including auditory or visual hallucinations, paranoia, or feelings of being unsafe.  The patient denies any history of trauma.  The patient denies any history of head injuries or seizures.  The patient is interested in receiving information regarding resources for outpatient substance abuse rehabilitation that would not interfere with his ability to work and attend other activities.    Collateral:   Latha Ferguson(Brother)  [phone] (Mobile)      REVIEW OF SYSTEMS:  I[x]I Patient denies any pertinent ROS, and none is known.  I[]I Patient unable or unwilling to provide any ROS.    [x] Y  [] N  sleep disturbance (due to pain from pancreatitis)  [x] Y  [] N  appetite/weight change (due to pain from pancreatitis)  [x] Y  [] N  fatigue/anergia  [x] Y  [] N  impairment in focus/concentration  [] Y  [x] N  depression  [] Y  [x] N  anxiety/worry  [x] Y  [] N  dysregulated mood/behavior  [] Y  [x] N  manic symptomatology  [] Y  [x] N  psychosis:    A pertinent medical review of systems was performed with the following notable findings: abdominal pain    CURRENT PSYCHOTROPIC REGIMEN:  I[]I Y  I[x]I N  I[]I U        ADDICTION:     I[x]I Y  I[]I N  I[]I U  I[]I Current  I[]I Former  Nicotine Use:   I[x]I Y  I[]I N  I[]I U  I[]I Current  I[]I Former  Alcohol Use:   I[x]I Y  I[]I N  I[]I U  I[]I Current  I[]I Former  Alcohol Misuse/Abuse:   I[x]I Y  I[]I N  I[]I U  I[]I Current  I[]I Former  Illicit Drug Use/Misuse/Abuse:   I[]I Y  I[x]I N  I[]I U  I[]I Current  I[]I Former  Misuse/Abuse of Rx Medications:   I[x]I Cannabis  I[]I Cocaine  I[]I Heroin  I[]I Meth  I[]I Opioids  I[]I Stimulants  I[]I Benzos  I[]I Other:     I[]I N/A  I[]I U  Substance(s) of Choice: Alcohol, Cannabis  I[]I N/A  I[]I U  Substance(s) Used Currently/Recently: Alcohol, Cannabis  I[]I N/A  I[]I U  Alcohol Consumption: daily or near daily,  "physiologically dependent   I[]I N/A  I[]I U  Last Drink: night before presentation  I[]I N/A  I[]I U  Last Drug Use: night before presentation  I[]I N/A  I[]I U  Duration of Sobriety/Abstinence: unable to recall    I[]I Y  I[x]I N  I[]I U  Hx of Detox:   I[]I Y  I[x]I N  I[]I U  Hx of Rehab:   I[]I Y  I[x]I N  I[]I U  Hx of IVDU:   I[]I Y  I[x]I N  I[]I U  Hx of Accidental Overdose:   I[]I Y  I[x]I N  I[]I U  Hx of DUI:   I[]I Y  I[x]I N  I[]I U  Hx of Complicated Withdrawal (i.e. Seizures and/or Delirium Tremens):   I[]I Y  I[x]I N  I[]I U  Hx of Known/Suspected Substance-Induced Psychiatric Disorder:   I[]I Y  I[x]I N  I[]I U  Hx of Medication Assisted Treatment:   I[]I Y  I[x]I N  I[]I U  Hx of Twelve Step Program (or Equivalent) Involvement:   I[]I Y  I[x]I N  I[]I U  Currently Exhibits Signs of Intoxication:   I[]I Y  I[x]I N  I[]I U  Currently Exhibits Signs of Withdrawal:   I[]I Y  I[x]I N  I[]I U  Currently Active in Recovery:   I[x]I Y  I[]I N  I[]I U  Social Support:   I[]I Y  I[x]I N  I[]I U  Spouse/Partner Consumption:     I[]I N/A  I[x]I Y  I[]I N  I[]I U  Acknowledges/Accepts Addiction:   I[]I N/A  I[x]I Y  I[]I N  I[]I U  Advised to Quit/Cut Back:   I[]I N/A  I[x]I Y  I[]I N  I[]I U  Alcohol/Drug Cessation ("Wants to Quit"): Interested in Quitting, Advised to Quit , Encouragement Provided, Motivational Interviewing Provided, Resources Provided  I[]I N/A  I[]I Y  I[]I N  I[]I U  Motivation to Pursue Treatment: High, motivated by physical consequences, Willing to Engage in Twelve Step Program (or Equivalent) But Not Formal Treatment  I[]I N/A  I[x]I Y  I[]I N  I[]I U  Tobacco Cessation ("Wants to Quit"): interested in quitting, encouragement provided    DSM-5-TR SUBSTANCE USE DISORDER CRITERIA:     -- Impaired Control:  I[x]I Y  I[]I N  I[]I U  I[]I A  I[]I D  Often take in larger amounts or over a longer period of time than was intended:   I[x]I Y  I[]I N  I[]I U  I[]I A  I[]I D  Persistent desire or " unsuccessful efforts to cut down or control use:   I[x]I Y  I[]I N  I[]I U  I[]I A  I[]I D  Great deal of time spent in activities necessary to obtain substance, use, or recover from effects:   I[x]I Y  I[]I N  I[]I U  I[]I A  I[]I D  Craving/strong desire for substance or urge to use:   -- Social Impairment:  I[x]I Y  I[]I N  I[]I U  I[]I A  I[]I D  Use resulting in failure to fulfill major role obligations at home, work or school:   I[x]I Y  I[]I N  I[]I U  I[]I A  I[]I D  Social, occupational, recreational activities decreased because of use:   I[x]I Y  I[]I N  I[]I U  I[]I A  I[]I D  Continued use despite having persistent or recurrent social or interpersonal problems caused or exacerbated by the substance:   -- Risky Use:  I[]I Y  I[]I N  I[]I U  I[]I A  I[]I D  Recurrent use in situations in which it is physically hazardous:   I[]I Y  I[]I N  I[]I U  I[]I A  I[]I D  Use despite physical or psychological problems that are likely to have been caused or exacerbated by the substance:   -- Neuroadaptation:  I[x]I Y  I[]I N  I[]I U  I[]I A  I[]I D  Tolerance, as defined by either of the following: (1) a need for markedly increased amounts of substance to achieve intoxication or desired effect.  -OR- (2) a markedly diminished effect with continued use of the same amount of substance:   I[x]I Y  I[]I N  I[]I U  I[]I A  I[]I D  Withdrawal, as manifested by either of the following: (1) the characteristic withdrawal syndrome for substance.  -OR- (2) substance is taken to relieve or avoid withdrawal symptoms:   -- Mild (1-3), Moderate (4-5), Severe (?6)    I[]I N/A  I[x]I Y  I[]I N  I[]I U  I[]I A  I[]I D  Active Substance Use Disorder:       HISTORY:     I[]I Patient denies any history, and none is known.  I[]I Patient unable or unwilling to provide any history.    I[]I Y  I[x]I N  I[]I U  Psychiatric Diagnoses:   I[]I Y  I[x]I N  I[]I U  Current Psychiatric Provider (if Applicable):   I[]I Y  I[x]I N  I[]I U  Hx of  Psychiatric Hospitalization:   I[]I Y  I[x]I N  I[]I U  Hx of Outpatient Psychiatric Treatment (psychiatry/psychotherapy):   I[]I Y  I[x]I N  I[]I U  Psychotropic Trials:   I[]I Y  I[x]I N  I[]I U  Prior Suicide Attempts:   I[]I Y  I[x]I N  I[]I U  Hx of Suicidal Ideation:   I[]I Y  I[x]I N  I[]I U  Hx of Homicidal Ideation:   I[]I Y  I[x]I N  I[]I U  Hx of Self-Injurious Behavior (Non-Suicidal):   I[]I Y  I[x]I N  I[]I U  Hx of Violence:   I[]I Y  I[x]I N  I[]I U  Documented Hx of Malingering:     FAMILY HISTORY:  I[x]I Y  I[]I N  I[]I U    Multiple family members     I[]I Y  I[x]I N  I[]I U  Hx of Trauma/Neglect:   I[]I Y  I[x]I N  I[]I U  Hx of Physical Abuse:   I[]I Y  I[x]I N  I[]I U  Hx of Sexual Abuse:   I[x]I Y  I[]I N  I[]I U  Happy Childhood:   I[]I Y  I[x]I N  I[]I U  Significant Developmental Delay/Disability:   I[x]I Y  I[]I N  I[]I U  GED/High School Diploma or Beyond:   I[x]I Y  I[]I N  I[]I U  Currently Employed: works at [employer]  I[]I Y  I[x]I N  I[]I U  On or Applying for Disability:   I[x]I Y  I[]I N  I[]I U  Functions Independently:   I[x]I Y  I[]I N  I[]I U  Financially Stable:   I[x]I Y  I[]I N  I[]I U  Domiciled:   I[]I Y  I[x]I N  I[]I U  Lives Alone: lives with brother  I[x]I Y  I[]I N  I[]I U  Intact Support System: some family still lives out of state (did not return after [place])  I[x]I Y  I[]I N  I[]I U  Heterosexual/Cisgender:   I[]I Y  I[x]I N  I[]I U  Currently in a Romantic Relationship:   I[]I Y  I[x]I N  I[]I U  Ever :   I[]I Y  I[x]I N  I[]I U  Children/Dependents:   I[x]I Y  I[]I N  I[]I U  Methodist/Spiritual:   I[]I Y  I[x]I N  I[]I U   History:   I[]I Y  I[x]I N  I[]I U  Current Legal Issues:   I[]I Y  I[x]I N  I[]I U  Past Charges/Convictions:   I[]I Y  I[x]I N  I[]I U  Hx of Incarceration:   I[]I Y  I[x]I N  I[]I U  Access to a Gun?:   I[]I Y  I[x]I N  I[]I U  Hx of Seizure:   I[]I Y  I[x]I N  I[]I U  Hx of Significant Head Injury (e.g., Loss of  Consciousness, Concussion, Coma):   I[x]I Y  I[]I N  I[]I U  Medical History & Diagnoses:       The patient's past medical history has been reviewed and updated as appropriate within the electronic medical record system.    Alcohol-induced acute pancreatitis without infection or necrosis    Alcohol use disorder    Elevated LFTs    Primary hypertension    T2DM (type 2 diabetes mellitus)    Hepatic steatosis    Hyponatremia      Scheduled and PRN Medications: The electronic chart was reviewed and updated as appropriate.  See [facility] for details.    Current Facility-Administered Medications:     aluminum-magnesium hydroxide-simethicone 200-200-20 mg/5 mL suspension 30 mL, 30 mL, Oral, QID PRN, Greene, Mohammad, DO    amLODIPine tablet 5 mg, 5 mg, Oral, Daily, Greene, Mohammad, DO, 5 mg at 05/08/24 1014    dextrose 10% bolus 125 mL 125 mL, 12.5 g, Intravenous, PRN, Greene, Mohammad, DO    dextrose 10% bolus 250 mL 250 mL, 25 g, Intravenous, PRN, Greene, Mohammad, DO    folic acid tablet 1 mg, 1 mg, Oral, Daily, Greene, Mohammad, DO, 1 mg at 05/08/24 1507    glucagon (human recombinant) injection 1 mg, 1 mg, Intramuscular, PRN, Greene, Mohammad, DO    glucose chewable tablet 16 g, 16 g, Oral, PRN, Greene, Mohammad, DO    glucose chewable tablet 24 g, 24 g, Oral, PRN, Greene, Mohammad, DO    insulin aspart U-100 pen 0-10 Units, 0-10 Units, Subcutaneous, QID (AC + HS) PRN, Greene, Mohammad, DO, 2 Units at 05/08/24 1823    insulin aspart U-100 pen 6 Units, 6 Units, Subcutaneous, TIDWM, Greene, Mohammad, DO, 6 Units at 05/08/24 1823    insulin glargine U-100 (Lantus) pen 9 Units, 9 Units, Subcutaneous, BID, Greene, Mohammad, DO, 9 Units at 05/08/24 2006    melatonin tablet 6 mg, 6 mg, Oral, Nightly PRN, Greene, Mohammad, DO    morphine injection 3 mg, 3 mg, Intravenous, Q6H PRN, Greene, Mohammad, DO, 3 mg at 05/08/24 0003    multivitamin tablet, 1 tablet, Oral, Daily, GreeneFrancis brennan DO, 1 tablet at 05/08/24 1507     "naloxone 0.4 mg/mL injection 0.02 mg, 0.02 mg, Intravenous, PRN, Huger, Mohammad, DO    ondansetron injection 8 mg, 8 mg, Intravenous, Q8H PRN, Huger, Mohammad, DO    oxyCODONE immediate release tablet 5 mg, 5 mg, Oral, Q6H PRN, Huger, Mohammad, DO, 5 mg at 05/08/24 2004    polyethylene glycol packet 17 g, 17 g, Oral, Daily, Huger, Mohammad, DO, 17 g at 05/08/24 1014    promethazine tablet 25 mg, 25 mg, Oral, Q6H PRN, Huger, Mohammad, DO    sodium chloride 0.9% flush 10 mL, 10 mL, Intravenous, Q12H PRN, Huger, Mohammad, DO    thiamine tablet 100 mg, 100 mg, Oral, Daily, Huger, Mohammad, DO, 100 mg at 05/08/24 1507    Allergies:  Patient has no known allergies.    PSYCHOSOCIAL FACTORS:  Stressors (Biopsychosocial, Cultural and Environmental): physical health, substance use/addiction  Functioning Relationships: good support system    STRENGTHS AND LIABILITIES:   Strength: Patient has positive support network.  Strength: Patient has reasonable judgment.  Strength: Patient is accepting of treatment.  Strength: Patient embraces recovery.  Liability: Patient is acutely decompensated.  Liability: Patient lacks coping skills    Additional Relevant History, As Applicable:       EXAMINATION:     BP (!) 153/92   Pulse 108   Temp 98.5 °F (36.9 °C) (Oral)   Resp 16   Ht 5' 8" (1.727 m)   Wt 103.7 kg (228 lb 10.6 oz)   SpO2 96%   BMI 34.77 kg/m²     MENTAL STATUS EXAMINATION:  General Appearance: adequately groomed, appropriately dressed, in no apparent distress, lying in bed, obese  Behavior:  normal; cooperative; reasonably friendly, pleasant, and polite; appropriate eye-contact; under good behavioral control  Involuntary Movements and Motor Activity:  no abnormal involuntary movements noted, no psychomotor agitation or retardation  Gait and Station:  unable to assess - patient lying down or seated  Speech and Language:  intact; normal rate, rhythm, volume, tone, and pitch; conversational, spontaneous, and " "coherent; speaks and understands English proficiently and fluently; repeats words and phrases, no word finding difficulties are noted  Mood: "Getting better"  Affect:  euthymic  Thought Process and Associations:  intact; linear, goal-directed, organized, and logical; no loosening of associations noted  Thought Content and Perceptions:  no suicidal or homicidal ideation, no auditory or visual hallucinations, no paranoid ideation, no ideas of reference, no evidence of delusions or psychosis  Sensorium:  intact; alert with clear sensorium; oriented fully to person, place, time and situation  Recent and Remote Memory:  grossly intact, no significant impairments noted  Attention and Concentration:  grossly intact, attentive to the conversation and not readily distractible  Fund of Knowledge:   Insight:  demonstrates awareness of illness and situation  Judgment:  intact, behavior is adequate/appropriate to the circumstances, compliant with health provider's recommendations and instructions      RISK MANAGEMENT:     I[]I Y  I[x]I N  I[]I U  I[]I A  Suicidal Ideation/Behavior:   I[]I Y  I[x]I N  I[]I U  I[]I A  Homicidal Ideation/Behavior:  I[]I Y  I[x]I N  I[]I U  I[]I A  Violence:  I[]I Y  I[x]I N  I[]I U  I[]I A  Self-Injurious Behavior:     The patient is deemed to be .    I[]I Y  I[x]I N  I[x]I U  I[]I A  I[]I N/A  Minimization of Risk Parameters Suspected/Evident:   I[]I Y  I[x]I N  I[]I U  I[]I A  I[]I N/A  Exaggeration of Risk Parameters Suspected/Evident:       [] Y  [x] N  Danger to Self:   [] Y  [x] N  Danger to Others:   [] Y  [x] N  Grave Disability:       In cases of emergency, daily coverage provided by Acute/ER Psych MD, NP, PA, or SW, with contact numbers located in [facility] On Call Schedule.    Mala Oropeza  Department of Psychiatry  [facility]      KEY:     I[]I Y = Yes / Present / Endorses  I[]I N = No / Absent / Denies  I[]I U = Unknown / Unable to Assess / Unwilling to " Participate  I[]I A = Ambiguity Exists / Accuracy Uncertain  I[]I D = Denial or Minimization is Suspected/Evident  I[]I N/A = Non-Applicable    CHART REVIEW:     Available documentation has been reviewed, and pertinent elements of the chart have been incorporated into this evaluation where appropriate.    The patient's last Epic encounter in the psychiatry department was on: Visit date not found  The patient's first Epic encounter in the psychiatry department was on:      LA/MS  [facility]  Site reviewed - No recent discrepancies or irregularities are noted.      ADVICE AND COUNSELING:     [x] In cases of emergencies (e.g. SI/HI resulting in danger to self or others, functioning deteriorates to the level of grave disability), call 911 or [phone], or present to the emergency department for immediate assistance.  [x] Individuals should not operate a motor vehicle or heavy machinery if effects of medications or underlying symptoms/condition impair the ability to safely do so.    Alcohol, Tobacco, and Drug Counseling, as well as resources, has been provided, as warranted.     Shared medical decision making and informed consent are the hallmark and bedrock of good clinical care, and as such have been employed and obtained, respectively, to the degree possible.      Risk Mitigation Strategies, Harm Reduction Techniques, and Safety Netting are important interventions that can reduce acute and chronic risk, and as such have been employed to the degree possible.    Prescription Drug Management entails the review, recommendation, or consideration without recommendation of medications, and as such was employed during the encounter.    Additional Psychoeducation has been provided, as warranted.    Discussed, to the extent possible, diagnosis, risks and benefits of proposed treatment vs alternative treatments vs no treatment, potential side effects of these treatments and the inherent unpredictability of treatment. The patient  expresses understanding of the above and displays the capacity to agree with this treatment given said understanding. Patient also agrees that, currently, the benefits outweigh the risks and consents to treatment at this time.     Written material has been provided to supplement, augment, and reinforce any discussions and interventions, via the AVS or other pre-printed handouts, as warranted.      DIAGNOSTIC TESTING:     The chart was reviewed for recent diagnostic procedures and investigations, and pertinent results are noted below.     Glu 184 (H)  5/9/2024  Li *   *  TSH *   *    HgA1c 10.9 (H)  5/7/2024  VPA *   *   FT4 *   *    Na 131 (L)  5/9/2024  CLZ *   *  WBC 9.80  5/9/2024    Cr 0.9  5/9/2024  ANC 7.4; 75.3;  (H)  5/9/2024   Hgb 14.4  5/9/2024     BUN 11  5/9/2024  Trop I *   *  HCT 43.3  5/9/2024     GFR >60.0  5/9/2024   CPK *   *    5/9/2024     Alb 2.9 (L)  5/9/2024   PRL *   *  B12 *   *     T Bili 1.1 (H)  5/9/2024  Chol *   *  B9 *   *    ALP 78  5/9/2024  TGs *   *  B1 *   *    AST 48 (H)  5/9/2024  HDL *   *  Vit D *   *      (H)  5/9/2024  LDL *   *  HIV Non-reactive  5/7/2024     INR 1.1  5/7/2024  Arelis *   *   Hep C Non-reactive; Non-reactive;   5/7/2024    GGT *   *  Lip 227 (H)  5/7/2024  RPR *   *    MCV 75 (L)  5/9/2024   NH4 *   *  UPT *   *      PETH *   *  THC *   *    ETOH <10  5/7/2024  NAOMI *   *    EtG *   *  AMP *   *    ALC *   *  OPI *   *    BZO *   *  MTD *   *     BAR *   *  BUP *   *    PCP *   *  FEN *   *     Results for orders placed or performed during the hospital encounter of 05/07/24   EKG 12-lead    Collection Time: 05/07/24  1:22 PM   Result Value Ref Range    QRS Duration 76 ms    [facility] QTC Calculation 428 ms    Narrative    Test Reason : R10.9,    Vent. Rate : 089 BPM     Atrial Rate : 089 BPM     P-R Int : 132 ms          QRS Dur : 076 ms      QT Int : 352 ms       P-R-T Axes : 032 -14 049 degrees      QTc Int : 428 ms    Normal sinus rhythm  Minimal voltage criteria for LVH, may be normal variant ( R in aVL )  Borderline Abnormal ECG  No previous ECGs available  Confirmed by Fred ROBLEDO MD ([id number]) on 5/7/2024 2:38:10 PM    Referred By:             Confirmed By:Fred ROBLEDO MD       No results found for this or any previous visit.    CONSULTATION:     A diagnostic psychiatric evaluation was performed and responsiveness to treatment was assessed.  The patient demonstrates adequate ability/capacity to respond to treatment.    Inpatient consult to Psychiatry  Consult performed by: Mala Oropeza MD  Consult ordered by: Francis Dhaliwal DO          - The consulting clinician was informed of the encounter documentation.

## 2024-05-09 NOTE — PLAN OF CARE
POC reviewed with patient this shift.  A/O x4.  Respirations unlabored on RA.  Skin w/d.  Continent of b/b.  Ambulates to restroom without difficulty.  Pt c/o pain x1 thus far in shift.  PRN Oxy given with relief noted.  Tolerates meds whole with water without difficulty.  CBG monitoring in progress with scheduled/SS insulin administered as ordered.  VSS.  See flowsheet for full assessment.  Able to verbalize wants/needs.  No s/s of distress.  Fall/safety precautions maintained.      Problem: Adult Inpatient Plan of Care  Goal: Plan of Care Review  Outcome: Progressing     Problem: Diabetes Comorbidity  Goal: Blood Glucose Level Within Targeted Range  Outcome: Progressing  Intervention: Monitor and Manage Glycemia  Flowsheets (Taken 5/9/2024 3932)  Glycemic Management: blood glucose monitored

## 2024-05-09 NOTE — PROGRESS NOTES
Pt is preparing for discharge, I did teach him how to self administer his insulin. I explained it to him, demonstrated to him and he return demonstrated to me twice. I answered his questions and he states he feels comfortable with the process. Once his meds arrive at the bedside I will review the SQ insulin administration with him.

## 2024-05-09 NOTE — DISCHARGE SUMMARY
[facility] Medicine  Discharge Summary      Patient Name: Harshad Dodson  MRN: [id number]  ISSAC: [id number]  Patient Class: IP- Inpatient  Admission Date: 5/7/2024  Hospital Length of Stay: 1 days  Discharge Date and Time:  05/09/2024 1:40 PM  Attending Physician: Francis Dhaliwal DO   Discharging Provider: Francis Dhaliwal DO  Primary Care Provider: Marisel Primary Doctor  [facility] Medicine Team: [facility] HOSP MED W Francis Dhaliwal DO  Primary Care Team: [facility] HOSP MED W    HPI:   The patient is a 44 y/o M with NIDDM (unsure of his last A1c, off all therapies for some time), HTN and EtOH use disorder that presents to [facility] with 12 hours of epigastric abdominal pain. He notes that the pain came all of a sudden and was 10/10, localized to the epigastric region without radiation. Abdominal pain was associated with nausea and vomiting. He reports bilious emesis. 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 Drink 5-6 shots of fireball daily + 2 -3 beers. He lives with his brother in [facility]. His brother brought him to the hospital today. He has not taking any medicines for diabetes or HTN in some time, but he is not sure for how long. States he was on insulin when he was first diagnosed with diabetes in 2013 or so, but was off insulin and on oral meds within 1-2 years. He denies any fevers, chills, cough, congestion, HA, palpitations or urinary symptoms. He does have his gallbladder and appendix. Last drink was 1-2 nights ago. He is not yet interested in meeting with addiction psychiatry.     * No surgery found *      Hospital Course:   Patient admitted to hospital medicine on 5/7 for acute pancreatitis 2/2 alcohol use as evidenced by imaging findings on CT on 5/7, classic abdominal pain as well as Lipase 3x ULN.  Started on IVF's and low fiber, low residue diet. Tolerated po intake well on 5/8 so fluids discontinued. Abdominal pain persists, but mild in quality. US Liver obtained to rule out gallstone disease/burden as well given noted elevated LFT's in the 500's. US Liver noting the liver is normal in size, measures 16 cm. The liver demonstrates homogeneous echo texture with no focal hepatic lesions seen. No biliary ductal dilatation. However, 5.9 cm complex fluid collection adjacent to the inferior right hepatic lobe was noted. IR consulted to weigh in on need for possible drainage vs outpatient repeat interval imaging. IR notes that they do not think this structure is amenable to drainage at this time as there is no defined structure on CT. Clinically without fevers, chills or white count. No interval imaging needed, but warning signs to seek further medical care including severe abdominal pain, fevers or chills discussed with the patient. LFT's trending down daily. Seen by addiction psychiatry and motivational counseling and resources provided.     Patient medically ready for discharge on 5/9. Diabetic supplies/kit ordered for the patient. Diabetic teaching with bedside nursing completed prior to discharge. Medications reconciled and patient to follow up with PCP and endocrinology outpatient. Pending PETH at discharge. Repeat CMP in 1 week.      Goals of Care Treatment Preferences:  Code Status: Full Code    Physical Exam  Vitals reviewed.   Constitutional:       General: He is not in acute distress.     Appearance: He is not toxic-appearing.   HENT:      Head: Normocephalic and atraumatic.      Nose: Nose normal.      Mouth/Throat:   Eyes:      General: No scleral icterus.     Extraocular Movements: Extraocular movements intact.   Cardiovascular:      Rate and Rhythm: Normal rate.      Pulses: Normal pulses.   Pulmonary:      Effort: Pulmonary effort is normal. No respiratory distress.      Breath sounds: No wheezing or  "rales.   Abdominal:      General: Abdomen is flat. There is no distension.      Palpations: Abdomen is soft. There is no mass.      Tenderness: There is abdominal tenderness (epigastric region, improved and mild). There is no guarding.   Skin:     General: Skin is warm and dry.      Coloration: Skin is not jaundiced.   Neurological:      Mental Status: He is alert and oriented to person, place, and time. Mental status is at baseline.   Psychiatric:         Mood and Affect: Mood normal.         Behavior: Behavior normal.     Consults:   Consults (From admission, onward)          Status Ordering Provider     Inpatient consult to Psychiatry  Once        Provider:  (Not yet assigned)    Completed COOKIE CEBALLOS     Inpatient consult to Interventional Radiology  Once        Provider:  (Not yet assigned)    Completed COOKIE CEBALLOS            No new Assessment & Plan notes have been filed under this hospital service since the last note was generated.  Service: Hospital Medicine    Final Active Diagnoses:    Diagnosis Date Noted POA    PRINCIPAL PROBLEM:  Alcohol-induced acute pancreatitis without infection or necrosis [K85.20] 05/07/2024 Yes    Hyponatremia [E87.1] 05/08/2024 Yes    Alcohol use disorder [F10.90] 05/07/2024 Yes    Elevated LFTs [R79.89] 05/07/2024 Yes    Primary hypertension [I10] 05/07/2024 Yes    T2DM (type 2 diabetes mellitus) [E11.9] 05/07/2024 Yes    Hepatic steatosis [K76.0] 05/07/2024 Yes      Problems Resolved During this Admission:       Discharged Condition: stable    Disposition: Home or Self Care    Follow Up:    Patient Instructions:      DIABETIC INSULIN SUPPLIES FOR HOME USE     Order Specific Question Answer Comments   Height: 5' 8" (1.727 m)    Weight: 103.7 kg (228 lb 10.6 oz)    Length of need (1-99 months): 99 months   Is the Patient insulin dependent? Yes    How many times each day does your Patient test his or her glucose level? QID    Do you follow the Patient at least every 6 " months for Management of Diabetes? Yes      COMPREHENSIVE METABOLIC PANEL   Standing Status: Future Standing Exp. Date: 08/07/25     Ambulatory referral/consult to Endocrinology   Standing Status: Future   Referral Priority: Routine Referral Type: Consultation   Requested Specialty: Endocrinology   Number of Visits Requested: 1     Ambulatory referral/consult to Internal Medicine   Standing Status: Future   Referral Priority: Routine Referral Type: Consultation   Referral Reason: Specialty Services Required   Requested Specialty: Internal Medicine   Number of Visits Requested: 1       Significant Diagnostic Studies: Labs: CMP   Recent Labs   Lab 05/08/24  0709 05/09/24 0413   * 131*   K 4.2 3.8    99   CO2 15* 22*   * 184*   BUN 11 11   CREATININE 0.9 0.9   CALCIUM 8.9 8.9   PROT 6.4 6.4   ALBUMIN 3.2* 2.9*   BILITOT 1.3* 1.1*   ALKPHOS 96 78   * 48*   * 205*   ANIONGAP 16 10    and CBC   Recent Labs   Lab 05/08/24  0709 05/09/24 0413   WBC 9.75 9.80   HGB 16.4 14.4   HCT 50.2 43.3    212       Pending Diagnostic Studies:       Procedure Component Value Units Date/Time    Phosphatidylethanol (PETH) [[id number]] Collected: 05/07/24 1831    Order Status: Sent Lab Status: In process Updated: 05/07/24 1838    Specimen: Blood     Narrative:      Unit collect            Medications:  Reconciled Home Medications:      Medication List        START taking these medications      amLODIPine 2.5 MG tablet  Commonly known as: NORVASC  Take 1 tablet (2.5 mg total) by mouth once daily.  Start taking on: May 10, 2024     blood sugar diagnostic Strp  To check BG 3 times daily, to use with insurance preferred meter     blood-glucose meter kit  To check BG 3 times daily, to use with insurance preferred meter     insulin aspart U-100 100 unit/mL (3 mL) Inpn pen  Commonly known as: NovoLOG  Inject 6 Units into the skin 3 (three) times daily.     insulin glargine U-100 (Lantus) 100 unit/mL (3 mL)  "Inpn pen  Inject 18 Units into the skin once daily.     lancets Misc  To check BG 3 times daily, to use with insurance preferred meter     pen needle, diabetic 31 gauge x 5/16" Ndle  Commonly known as: BD ULTRA-FINE SHORT PEN NEEDLE  Attach to insulin pens and inject insulin as instructed. Use a new pen needle with each injection              Indwelling Lines/Drains at time of discharge:   Lines/Drains/Airways       None                   Time spent on the discharge of patient: 45 minutes         Francis Dhaliwal DO  Department of Hospital Medicine  [facility]  "

## 2024-05-09 NOTE — PROGRESS NOTES
Pt is discharging to home, IV access removed, medications delivered to the bedside, blood glucose meter also delivered with all of the supplies, I reviewed the use of the meter with the Pt and he stated he was familiar with it and felt comfortable using it. I reviewed his discharge instructions as well as his plan of care. Questions answered, pt verbalized his understanding. Pt did request a ride home. I assisted him to the front of the building and he was communicating with the ride service. Pt has no complaints or concerns.

## 2024-05-13 LAB
CLINICAL BIOCHEMIST REVIEW: NORMAL
PLPETH BLD-MCNC: 1011 NG/ML
POPETH BLD-MCNC: 1183 NG/ML

## 2024-06-24 ENCOUNTER — PATIENT MESSAGE (OUTPATIENT)
Dept: ENDOCRINOLOGY | Facility: CLINIC | Age: 44
End: 2024-06-24